# Patient Record
Sex: FEMALE | Race: WHITE | Employment: UNEMPLOYED | URBAN - METROPOLITAN AREA
[De-identification: names, ages, dates, MRNs, and addresses within clinical notes are randomized per-mention and may not be internally consistent; named-entity substitution may affect disease eponyms.]

---

## 2019-08-30 ENCOUNTER — OFFICE VISIT (OUTPATIENT)
Dept: ENDOCRINOLOGY | Age: 59
End: 2019-08-30
Payer: COMMERCIAL

## 2019-08-30 ENCOUNTER — TELEPHONE (OUTPATIENT)
Dept: ENDOCRINOLOGY | Age: 59
End: 2019-08-30

## 2019-08-30 VITALS
BODY MASS INDEX: 15.95 KG/M2 | RESPIRATION RATE: 16 BRPM | HEIGHT: 64 IN | WEIGHT: 93.4 LBS | HEART RATE: 74 BPM | DIASTOLIC BLOOD PRESSURE: 62 MMHG | OXYGEN SATURATION: 98 % | SYSTOLIC BLOOD PRESSURE: 112 MMHG

## 2019-08-30 DIAGNOSIS — M81.0 OSTEOPOROSIS, UNSPECIFIED OSTEOPOROSIS TYPE, UNSPECIFIED PATHOLOGICAL FRACTURE PRESENCE: Primary | ICD-10-CM

## 2019-08-30 DIAGNOSIS — E55.9 VITAMIN D DEFICIENCY: ICD-10-CM

## 2019-08-30 DIAGNOSIS — E03.9 HYPOTHYROIDISM, UNSPECIFIED TYPE: ICD-10-CM

## 2019-08-30 PROCEDURE — 99214 OFFICE O/P EST MOD 30 MIN: CPT | Performed by: INTERNAL MEDICINE

## 2019-08-30 RX ORDER — CHOLECALCIFEROL (VITAMIN D3) 1250 MCG
CAPSULE ORAL
COMMUNITY
End: 2019-09-12 | Stop reason: SDUPTHER

## 2019-08-30 RX ORDER — HYOSCYAMINE SULFATE EXTENDED-RELEASE 0.38 MG/1
375 TABLET ORAL PRN
COMMUNITY

## 2019-08-30 RX ORDER — GABAPENTIN 300 MG/1
300 CAPSULE ORAL DAILY
COMMUNITY

## 2019-08-30 RX ORDER — AMOXICILLIN 500 MG/1
500 CAPSULE ORAL 3 TIMES DAILY
COMMUNITY
End: 2020-03-02

## 2019-08-30 RX ORDER — TRAMADOL HYDROCHLORIDE 50 MG/1
50 TABLET ORAL PRN
COMMUNITY
End: 2020-03-02

## 2019-08-30 RX ORDER — ALPRAZOLAM 0.5 MG/1
0.5 TABLET ORAL NIGHTLY PRN
COMMUNITY

## 2019-08-30 RX ORDER — SUCRALFATE 1 G/1
1 TABLET ORAL 4 TIMES DAILY
COMMUNITY

## 2019-08-30 RX ORDER — CALCITONIN SALMON 200 [IU]/.09ML
1 SPRAY, METERED NASAL DAILY
COMMUNITY
End: 2021-04-14

## 2019-08-30 RX ORDER — CYANOCOBALAMIN 1000 UG/ML
1000 INJECTION INTRAMUSCULAR; SUBCUTANEOUS
COMMUNITY

## 2019-08-30 RX ORDER — MIRTAZAPINE 30 MG/1
30 TABLET, FILM COATED ORAL NIGHTLY
COMMUNITY

## 2019-08-30 RX ORDER — LEVOTHYROXINE SODIUM 0.15 MG/1
150 TABLET ORAL DAILY
COMMUNITY
End: 2020-03-02

## 2019-08-30 RX ORDER — CLOBETASOL PROPIONATE 0.5 MG/ML
LOTION TOPICAL 2 TIMES DAILY
COMMUNITY
End: 2020-03-02

## 2019-08-30 RX ORDER — ONDANSETRON HYDROCHLORIDE 8 MG/1
8 TABLET, FILM COATED ORAL EVERY 8 HOURS PRN
COMMUNITY

## 2019-08-30 RX ORDER — POTASSIUM CHLORIDE 20MEQ/15ML
20 LIQUID (ML) ORAL DAILY
COMMUNITY

## 2019-08-30 NOTE — PROGRESS NOTES
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    Pernicious anemia 10/14/2011    Unspecified disease of white blood cells 10/14/2011     PAST SURGICAL HISTORY    COLONOSCOPY    REMOVAL GALLBLADDER    STOMACH SURGERY PROCEDURE UNLISTED    UPPER GI ENDOSCOPY     SOCIAL HISTORY    Marital status:  Spouse name: N/A     Smoking status: Never Smoker    Smokeless tobacco: Never Used    Alcohol use Yes     FAMILY HISTORY    Diabetes Sister    Diabetes Brother    Ca, Skin Melanoma Biological Father      ALLERGIES AND DRUG REACTIONS   No Known Allergies    CURRENT MEDICATIONS     Current Outpatient Medications   Medication Sig Dispense Refill    ALPRAZolam (XANAX) 0.5 MG tablet Take 0.5 mg by mouth nightly as needed for Sleep.  calcitonin (MIACALCIN) 200 UNIT/ACT nasal spray 1 spray by Nasal route daily      Cholecalciferol (VITAMIN D3) 76691 units CAPS Take by mouth 1 tablet every day expect sat and Sunday take 2 tablets      cyanocobalamin 1000 MCG/ML injection Inject 1,000 mcg into the muscle every 7 days      gabapentin (NEURONTIN) 300 MG capsule Take 300 mg by mouth daily.       hyoscyamine (LEVBID) 375 MCG extended release tablet Take 375 mcg by mouth as needed for Cramping      levothyroxine (SYNTHROID) 150 MCG tablet Take 150 mcg by mouth Daily      mirtazapine (REMERON) 30 MG tablet Take 30 mg by mouth nightly      CALCIUM-VITAMIN D PO Take by mouth daily      Nutritional Supplements (VITAL AF 1.2 ELISABETH ADV FORMULA PO) Take by mouth daily      Multiple Vitamins-Minerals (MULTIVITAMIN ADULT PO) Take by mouth daily      ondansetron (ZOFRAN) 8 MG tablet Take 8 mg by mouth every 8 hours as needed for Nausea or Vomiting      Pancrelipase, Lip-Prot-Amyl, (CREON PO) Take by mouth thyromegaly, no thyroid tenderness, no JVD. Pulm: clear equal air entry no added sounds,  CVS: S1 + S2, no murmur, no heave. Abd: soft lax no tenderness, no organomegaly, audible bowel sounds. MSK: no back deformity, no local spine tesnderness  Skin: warm, no lesions, no rash.  No striae no Bruises   Neuro: CN intact, sensation notmal , muscle power normal  Psych: normal mood, and affect     Lab review   I personally reviewed the following labs:   COMP METABOLIC PANEL 2/82/0154   Component Name Value   Glucose 100   Urea Nitrogen 21 (H)   Creatinine 1.28   BUN/CREATININE RATIO 16   Total Bilirubin 0.5   Total Protein 6.4   Albumin 3.2 (L)   A/G RATIO 1.0   Calcium(Ca) 9.0   Alkaline Phosphatase 57   Aspartate Aminot.(AST) 13 (L)   Alanine Aminotrans(ALT) 31   Sodium(Na) 136   Potassium(K) 2.1 (L)   Comment:  Results called to and read back by  DR CHON MENJIVAR 04 12 19 1755 DA   Chloride(Cl) 105   Carbon Dioxide(CO2) 24.0   ANION GAP 9.1   CALCULATED OSMOLALITY 285   EGFR NON  46 (L)     CBC AND DIFF W/ PLATELETSResulted: 9/1/2738   Component Name Value   WBC 4.1 (L)   RBC 4.38   Hgb 12.5   Hematocrit(HCT) 38.6   MCV 88.2   MCH 28.6   MCHC 32.4 (L)   RDW 18.3 (H)   Platelets 751   Mean Platelet Volume 7.8   Diff Type Automated Differential   Neutrophils 41.7   Lymphocytes 42.9   Monocytes 9.1   Eosinophils 4.7   Basophils 1.6   ABS Neutrophils 1.7 (L)   ABS Lymphocytes 1.8   ABS Monocytes 0.4   ABS Eosinophils 0.2   ABS Basophils 0.1     VITAMIN D 25-OH 4/4/2019   Component Name Value   Vitamin D, 25-Hydroxy 72.2         No results found for: WBC, RBC, HGB, HCT, MCV, MCH, MCHC, RDW, PLT, MPV, GRANULOCYTES, BANDS   No results found for: NA, K, CO2, BUN, CALCIUM, GFR   No results found for: LABA1C, GLUCOSE, MALBCR, LABMICR, LABCREA  No results found for: TSH, T4FREE, D0CIMQG, FT3, W9YSYUH, TSI, TPOABS, THGAB  No results found for: PTH  No results found for: MG  No results found for: PHOS  No results found and hence the fluctuation  - discussed possible timing- best for her will be at night 1 hr before starting the TF, and can make that 2:30-3 hrs after last food. No ca or MV or carafate close to it this way. - if doesn't stabilize after fixing timing/technical issues can then try brand synthroid, some people have fluctuations from generic from different companies. Return in about 6 months (around 2/29/2020) for Hypothyroidism, Osteoporosis . The above issues were reviewed with the patient who understood and agreed with the plan. Thank you for allowing us to participate in the care of this patient. Please do not hesitate to contact us with any additional questions. Diagnosis Orders   1. Osteoporosis, unspecified osteoporosis type, unspecified pathological fracture presence  Comprehensive Metabolic Panel    PTH, Intact    Vitamin D 25 Hydroxy    DEXA Bone Density Axial Skeleton    TSH without Reflex    denosumab (PROLIA) 60 MG/ML SOSY SC injection   2. Vitamin D deficiency  Vitamin D 25 Hydroxy   3.  Hypothyroidism, unspecified type  TSH without Reflex    T4, Free     Barb Sánchez MD  Endocrinologist, [facility])   [address] [place] [address], [address] [place] [address],[address] [address]   Phone: [phone]  Fax: [phone]  -------------------------------------------------------------  Electronically signed by James Elliott MD

## 2019-09-03 ENCOUNTER — TELEPHONE (OUTPATIENT)
Dept: ENDOCRINOLOGY | Age: 59
End: 2019-09-03

## 2019-09-04 LAB
ALBUMIN SERPL-MCNC: 2.6 G/DL
ALP BLD-CCNC: NORMAL U/L
ALT SERPL-CCNC: 53 U/L
ANION GAP SERPL CALCULATED.3IONS-SCNC: NORMAL MMOL/L
AST SERPL-CCNC: 48 U/L
BILIRUB SERPL-MCNC: NORMAL MG/DL (ref 0.1–1.4)
BUN BLDV-MCNC: NORMAL MG/DL
CALCIUM SERPL-MCNC: 8.5 MG/DL
CHLORIDE BLD-SCNC: NORMAL MMOL/L
CO2: NORMAL MMOL/L
CREAT SERPL-MCNC: 1.07 MG/DL
GFR CALCULATED: NORMAL
GLUCOSE BLD-MCNC: NORMAL MG/DL
POTASSIUM SERPL-SCNC: 3.1 MMOL/L
PTH INTACT: 32.7
SODIUM BLD-SCNC: 145 MMOL/L
T4 FREE: 1.13
TOTAL PROTEIN: NORMAL
TSH SERPL DL<=0.05 MIU/L-ACNC: 2.3 UIU/ML
VITAMIN D 25-HYDROXY: 37.4
VITAMIN D2, 25 HYDROXY: NORMAL
VITAMIN D3,25 HYDROXY: NORMAL

## 2019-09-06 ENCOUNTER — TELEPHONE (OUTPATIENT)
Dept: ENDOCRINOLOGY | Age: 59
End: 2019-09-06

## 2019-09-06 DIAGNOSIS — E55.9 VITAMIN D DEFICIENCY: ICD-10-CM

## 2019-09-06 DIAGNOSIS — M81.0 OSTEOPOROSIS, UNSPECIFIED OSTEOPOROSIS TYPE, UNSPECIFIED PATHOLOGICAL FRACTURE PRESENCE: ICD-10-CM

## 2019-09-06 DIAGNOSIS — E03.9 HYPOTHYROIDISM, UNSPECIFIED TYPE: ICD-10-CM

## 2019-09-12 DIAGNOSIS — E55.9 VITAMIN D DEFICIENCY: Primary | ICD-10-CM

## 2019-09-14 RX ORDER — CHOLECALCIFEROL (VITAMIN D3) 1250 MCG
CAPSULE ORAL
Qty: 1 CAPSULE | Refills: 1 | Status: SHIPPED | OUTPATIENT
Start: 2019-09-14 | End: 2019-10-29 | Stop reason: CLARIF

## 2019-09-17 ENCOUNTER — TELEPHONE (OUTPATIENT)
Dept: ENDOCRINOLOGY | Age: 59
End: 2019-09-17

## 2019-09-19 ENCOUNTER — TELEPHONE (OUTPATIENT)
Dept: ENDOCRINOLOGY | Age: 59
End: 2019-09-19

## 2019-09-24 ENCOUNTER — NURSE ONLY (OUTPATIENT)
Dept: ENDOCRINOLOGY | Age: 59
End: 2019-09-24
Payer: COMMERCIAL

## 2019-09-24 DIAGNOSIS — M81.8 OTHER OSTEOPOROSIS, UNSPECIFIED PATHOLOGICAL FRACTURE PRESENCE: Primary | ICD-10-CM

## 2019-09-24 PROCEDURE — 96372 THER/PROPH/DIAG INJ SC/IM: CPT | Performed by: INTERNAL MEDICINE

## 2019-10-29 DIAGNOSIS — M81.8 OTHER OSTEOPOROSIS, UNSPECIFIED PATHOLOGICAL FRACTURE PRESENCE: Primary | ICD-10-CM

## 2019-10-29 RX ORDER — ERGOCALCIFEROL (VITAMIN D2) 1250 MCG
CAPSULE ORAL
Qty: 114 CAPSULE | Refills: 1 | Status: SHIPPED
Start: 2019-10-29 | End: 2020-05-12 | Stop reason: SDUPTHER

## 2020-03-02 ENCOUNTER — OFFICE VISIT (OUTPATIENT)
Dept: ENDOCRINOLOGY | Age: 60
End: 2020-03-02
Payer: COMMERCIAL

## 2020-03-02 VITALS
OXYGEN SATURATION: 98 % | RESPIRATION RATE: 16 BRPM | DIASTOLIC BLOOD PRESSURE: 58 MMHG | BODY MASS INDEX: 14.99 KG/M2 | SYSTOLIC BLOOD PRESSURE: 90 MMHG | HEART RATE: 63 BPM | WEIGHT: 84.6 LBS | HEIGHT: 63 IN

## 2020-03-02 PROCEDURE — 99214 OFFICE O/P EST MOD 30 MIN: CPT | Performed by: INTERNAL MEDICINE

## 2020-03-02 NOTE — LETTER
hematochezia   : Denied any dysuria, hematuria, flank pain, discharge, or incontinence. Skin: denied any rash, ulcer, Hirsute, or hyperpigmentation. MSK: denied any joint deformity, joint pain/swelling, muscle pain, or back pain. Neuro: no numbess, no tingling, no weakness, __  Psychiatric/Behavioral: Negative for sleep disturbance and dysphoric mood. The patient is not nervous/anxious. Objective:   BP (!) 90/58 (Site: Left Upper Arm, Position: Sitting, Cuff Size: Medium Adult)   Pulse 63   Resp 16   Ht 5' 3\" (1.6 m)   Wt 84 lb 9.6 oz (38.4 kg)   SpO2 98%   BMI 14.99 kg/m²    BP Readings from Last 4 Encounters:   03/02/20 (!) 90/58   08/30/19 112/62     Wt Readings from Last 6 Encounters:   03/02/20 84 lb 9.6 oz (38.4 kg)   08/30/19 93 lb 6.4 oz (42.4 kg)       Physical examination:  General: awake alert, no abnormal position or movements. HEENT: normocephalic non traumatic. Neck: supple, no LN enlargement, no thyromegaly, no thyroid tenderness, no JVD. Pulm: clear equal air entry no added sounds,  CVS: S1 + S2, no murmur, no heave. Abd: soft lax no tenderness, no organomegaly, audible bowel sounds. MSK: no back deformity, no local spine tesnderness  Skin: warm, no lesions, no rash.  No striae no Bruises   Neuro: CN intact, sensation notmal , muscle power normal  Psych: normal mood, and affect     Lab review   I personally reviewed the following labs:   BASIC METABOLIC PANEL (93/90/3943 11:30 AM EST)  Component Value   Glucose 95   Urea Nitrogen 22 (H)   Creatinine 0.86   BUN/CREATININE RATIO 26   Sodium(Na) 146 (H)   Potassium(K) 3.3 (L)   Chloride(Cl) 112 (H)   Carbon Dioxide(CO2) 30.0   ANION GAP 7.3   Calcium(Ca) 8.8   CALCULATED OSMOLALITY 305 (H)   EGFR NON  73   LIPID BATTERY (01/09/2020 11:30 AM EST)  Component Value   Cholesterol 175   Triglyceride 51   High Density Lipoprotein(HDL) 91 (H)   Very Low Density Lipoprotein 10   Low Density Lipoprotein 74 - avoiding oral Bisphosphonate due to significant GI problems and large gastric ulcer and GI bleeping   - last DXA 10/2017 lowest T -4  - on prolia since 3/2018, tolerated well  - Next prolia injection 3/24/2020. VitD and calcium were normal few weeks ago  - cont vit D  - fall precautions  - Discussed the importance of muscle strengthening and weight bearing exercise. vitD deficiency   - With multiple GI surgery she developed significant malabsorption to vitD   - on vitD 50,000 units 1 capsule 5 days a week and 2 capsule on Saturday and Sunday   - Level now 32     Hypothyroidism  - currently on levothyroxine 175 mcg daily   - malabsorption is the likely cause of high requirement   - if doesn't stabilize after fixing timing/technical issues can then try brand synthroid, some people have fluctuations from generic from different companies. Return in about 1 year (around 3/2/2021) for Osteoporosis . The above issues were reviewed with the patient who understood and agreed with the plan. Thank you for allowing us to participate in the care of this patient. Please do not hesitate to contact us with any additional questions. Diagnosis Orders   1. Osteoporosis, unspecified osteoporosis type, unspecified pathological fracture presence  Basic Metabolic Panel    Vitamin D 25 Hydroxy   2. Hypothyroidism, unspecified type  TSH without Reflex    T4, Free   3.  Vitamin D deficiency  Vitamin D 25 Hydroxy     Elise Marcus MD  Endocrinologist, [facility])   [address] [place] [address]   Phone: [phone]  Fax: [phone]  -------------------------------------------------------------  Electronically signed by Giorgi Alston MD

## 2020-03-02 NOTE — PROGRESS NOTES
[facility] [address] [facility] Department of Endocrinology Diabetes and Metabolism   [address] [facility] [address]   Phone: [phone]  Fax: [phone]    Date of Service: 3/2/2020    Primary Care Physician: Kimberlee Moreno DO. Provider: [place] MD        Reason for the visit:  Osteoporosis     History of present illness: The history is provided by the patient. No  was used. Accuracy of the patient data is excellent  Isabel Quinonez is a very pleasant 59 y.o. female seen in Endocrine clinic today for     Ms Isabel Quinonez has osteoporosis   In 2/2016 she fell and broke her Rt forearm. Was diagnosed with osteoporosis then, before that had osteopenia. She reported significant GI issue. She had multiple GI surgeries for perforated large peptic ulcer in 2009. has feeding tube in place but still able to swallow pills   She is getting Vital 1.2 tube feeds through feeding tube   denied personal or family history of kidney stone. With h/o significant PUD, bisphosphonates were avoided. Reported was given Calcitonin initially by PCP, says still on it. Started on Prolia in March/2018, 10/2018    last DXA scan 10/11/2017   Total Hip T-score of -4.2 --> 13.4% decrease in BMD from baseline exam of 06/10/2013 . LS T-score of -4.1 --> 10.2% decrease in BMD from baseline. She is currently on vitD 50,000 dailyx5 days then 2 tabs a day on weekends. Level improved. She is on calquick one a day, doesn't know the content or dose, and a MV. Has cheese and lots of dairy in diet. No falls or fractures. No pending dental work. Patient also has hypothyroidism on 175 mcg daily just increased by PCP due to TSH above goal. Per PCP. Takes vit D and carafate with it. Takes ca during the day.  Takes the TF t night 9:30-6:30 am.       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    Pernicious anemia 10/14/2011    Unspecified disease of white blood cells 10/14/2011     PAST SURGICAL HISTORY    COLONOSCOPY    REMOVAL GALLBLADDER    STOMACH SURGERY PROCEDURE UNLISTED    UPPER GI ENDOSCOPY     SOCIAL HISTORY    Marital status:  Spouse name: N/A     Smoking status: Never Smoker    Smokeless tobacco: Never Used    Alcohol use Yes     FAMILY HISTORY    Diabetes Sister    Diabetes Brother    Ca, Skin Melanoma Biological Father      ALLERGIES AND DRUG REACTIONS   No Known Allergies    CURRENT MEDICATIONS     Current Outpatient Medications   Medication Sig Dispense Refill    ergocalciferol (ERGOCALCIFEROL) 1.25 MG (07199 UT) capsule One tab po Monday through Friday, and 2 tablets Saturday and Sunday. 114 capsule 1    ALPRAZolam (XANAX) 0.5 MG tablet Take 0.5 mg by mouth nightly as needed for Sleep.  calcitonin (MIACALCIN) 200 UNIT/ACT nasal spray 1 spray by Nasal route daily      cyanocobalamin 1000 MCG/ML injection Inject 1,000 mcg into the muscle every 7 days      gabapentin (NEURONTIN) 300 MG capsule Take 300 mg by mouth daily.       hyoscyamine (LEVBID) 375 MCG extended release tablet Take 375 mcg by mouth as needed for Cramping      levothyroxine (SYNTHROID) 150 MCG tablet Take 150 mcg by mouth Daily Pt takes 175 mcg daily      mirtazapine (REMERON) 30 MG tablet Take 30 mg by mouth nightly      CALCIUM-VITAMIN D PO Take by mouth daily      Nutritional Supplements (VITAL AF 1.2 ELISBAETH ADV FORMULA PO) Take by mouth daily      Multiple Vitamins-Minerals (MULTIVITAMIN ADULT PO) Take by mouth daily      ondansetron (ZOFRAN) 8 MG tablet Take 8 mg by mouth every 8 hours as needed for Nausea or Vomiting      Pancrelipase, Lip-Prot-Amyl, (CREON PO) Take by mouth 180,000 units, 34631 units 237912--- takes enlargement, no thyromegaly, no thyroid tenderness, no JVD. Pulm: clear equal air entry no added sounds,  CVS: S1 + S2, no murmur, no heave. Abd: soft lax no tenderness, no organomegaly, audible bowel sounds. MSK: no back deformity, no local spine tesnderness  Skin: warm, no lesions, no rash. No striae no Bruises   Neuro: CN intact, sensation notmal , muscle power normal  Psych: normal mood, and affect     Lab review   I personally reviewed the following labs:   BASIC METABOLIC PANEL (44/56/0909 11:30 AM EST)  Component Value   Glucose 95   Urea Nitrogen 22 (H)   Creatinine 0.86   BUN/CREATININE RATIO 26   Sodium(Na) 146 (H)   Potassium(K) 3.3 (L)   Chloride(Cl) 112 (H)   Carbon Dioxide(CO2) 30.0   ANION GAP 7.3   Calcium(Ca) 8.8   CALCULATED OSMOLALITY 305 (H)   EGFR NON  73   LIPID BATTERY (01/09/2020 11:30 AM EST)  Component Value   Cholesterol 175   Triglyceride 51   High Density Lipoprotein(HDL) 91 (H)   Very Low Density Lipoprotein 10   Low Density Lipoprotein 74     VITAMIN D 25-OH SCREEN FOR DEFICIENCY/TOXICITY (01/09/2020 11:30 AM EST)  Component Value   Vitamin D, 25-Hydroxy 32.9     HEPATIC FUNCTION PANEL (01/09/2020 11:30 AM EST)  Component Value   Total Bilirubin 0.5   Direct Bilirubin 0.1   Total Protein 5.7 (L)   Albumin 2.9 (L)   A/G RATIO 1.0   Alkaline Phosphatase 41 (L)   Aspartate Aminot.(AST) 26   Alanine Aminotrans(ALT) 36     TSH/THY. STIM. HORM (01/09/2020 11:30 AM EST)  Component Value   TSH/Thy.Stim. Horm 3.700     CBC AND DIFF W/ PLATELETS (30/14/2012 11:30 AM EST)  Component Value   WBC 4.6 (L)   RBC 4.22   Hgb 11.7 (L)   Hematocrit(HCT) 36.0 (L)   MCV 85.3   MCH 27.8 (L)   MCHC 32.6 (L)   RDW 15.3 (H)   Platelets 425   Mean Platelet Volume 8.2   Diff Type Automated Differential   Neutrophils 48.3   Lymphocytes 35.5   Monocytes 9.6   Eosinophils 4.5   Basophils 2.1   ABS Neutrophils 2.2   ABS Lymphocytes 1.6   ABS Monocytes 0.4   ABS Eosinophils 0.2   ABS Basophils 0.1 and agreed with the plan. Thank you for allowing us to participate in the care of this patient. Please do not hesitate to contact us with any additional questions. Diagnosis Orders   1. Osteoporosis, unspecified osteoporosis type, unspecified pathological fracture presence  Basic Metabolic Panel    Vitamin D 25 Hydroxy   2. Hypothyroidism, unspecified type  TSH without Reflex    T4, Free   3.  Vitamin D deficiency  Vitamin D 25 Hydroxy     Scott Wu MD  Endocrinologist, [address] [address]   Phone: [phone]  Fax: [phone]  -------------------------------------------------------------  Electronically signed by Koffi Sanchez MD

## 2020-03-23 ENCOUNTER — TELEPHONE (OUTPATIENT)
Dept: ENDOCRINOLOGY | Age: 60
End: 2020-03-23

## 2020-04-02 ENCOUNTER — NURSE ONLY (OUTPATIENT)
Dept: ENDOCRINOLOGY | Age: 60
End: 2020-04-02

## 2020-05-12 RX ORDER — ERGOCALCIFEROL (VITAMIN D2) 1250 MCG
CAPSULE ORAL
Qty: 114 CAPSULE | Refills: 3 | Status: SHIPPED
Start: 2020-05-12 | End: 2021-08-31 | Stop reason: SDUPTHER

## 2020-10-23 ENCOUNTER — NURSE ONLY (OUTPATIENT)
Dept: ENDOCRINOLOGY | Age: 60
End: 2020-10-23
Payer: COMMERCIAL

## 2020-10-23 PROCEDURE — 96372 THER/PROPH/DIAG INJ SC/IM: CPT | Performed by: INTERNAL MEDICINE

## 2020-10-23 RX ORDER — DENOSUMAB 60 MG/ML
60 INJECTION SUBCUTANEOUS ONCE
Qty: 1 ML | Refills: 0 | Status: SHIPPED
Start: 2020-10-23 | End: 2021-04-14

## 2021-03-02 ENCOUNTER — OFFICE VISIT (OUTPATIENT)
Dept: ENDOCRINOLOGY | Age: 61
End: 2021-03-02
Payer: MEDICARE

## 2021-03-02 VITALS
DIASTOLIC BLOOD PRESSURE: 68 MMHG | SYSTOLIC BLOOD PRESSURE: 110 MMHG | HEART RATE: 64 BPM | BODY MASS INDEX: 16.79 KG/M2 | OXYGEN SATURATION: 99 % | WEIGHT: 94.8 LBS | TEMPERATURE: 97.7 F

## 2021-03-02 DIAGNOSIS — E03.9 HYPOTHYROIDISM, UNSPECIFIED TYPE: ICD-10-CM

## 2021-03-02 DIAGNOSIS — M81.0 OSTEOPOROSIS, UNSPECIFIED OSTEOPOROSIS TYPE, UNSPECIFIED PATHOLOGICAL FRACTURE PRESENCE: ICD-10-CM

## 2021-03-02 DIAGNOSIS — M81.8 OTHER OSTEOPOROSIS, UNSPECIFIED PATHOLOGICAL FRACTURE PRESENCE: Primary | ICD-10-CM

## 2021-03-02 DIAGNOSIS — E55.9 VITAMIN D DEFICIENCY: ICD-10-CM

## 2021-03-02 PROCEDURE — G8427 DOCREV CUR MEDS BY ELIG CLIN: HCPCS | Performed by: INTERNAL MEDICINE

## 2021-03-02 PROCEDURE — G8419 CALC BMI OUT NRM PARAM NOF/U: HCPCS | Performed by: INTERNAL MEDICINE

## 2021-03-02 PROCEDURE — G8484 FLU IMMUNIZE NO ADMIN: HCPCS | Performed by: INTERNAL MEDICINE

## 2021-03-02 PROCEDURE — 3017F COLORECTAL CA SCREEN DOC REV: CPT | Performed by: INTERNAL MEDICINE

## 2021-03-02 PROCEDURE — 99214 OFFICE O/P EST MOD 30 MIN: CPT | Performed by: INTERNAL MEDICINE

## 2021-03-02 PROCEDURE — 1036F TOBACCO NON-USER: CPT | Performed by: INTERNAL MEDICINE

## 2021-03-02 NOTE — PROGRESS NOTES
proliaMHYX [address] [facility] Department of Endocrinology Diabetes and Metabolism   [address] [facility] [address]   Phone: [phone]  Fax: [phone]    Date of Service: 3/2/2021  Primary Care Physician: Vinicio Ocampo DO. Provider: Mary Salomon MD        Reason for the visit:  Osteoporosis, vitD deficiency      History of present illness: The history is provided by the patient. No  was used. Accuracy of the patient data is excellent  Meredith Yeung is a very pleasant 56 y.o. female seen today for follow up visit     In 2/2016 she fell and broke her Rt forearm. Was diagnosed with osteoporosis then, before that had osteopenia. She reported significant GI issue. She had multiple GI surgeries for perforated large peptic ulcer in 2009. has feeding tube in place but still able to swallow pills   She is getting Vital 1.2 tube feeds through feeding tube     The patient denied personal or family history of kidney stone. With h/o significant PUD, bisphosphonates were avoided. Reported was given Calcitonin initially by PCP and still on it. Started on Prolia in March/2018 and tolerating it very well     DXA scan 10/11/2017   Total Hip T-score of -4.2 --> 13.4% decrease in BMD from baseline exam of 06/10/2013 . LS T-score of -4.1 --> 10.2% decrease in BMD from baseline. last DXA 10/2019, Hip T score - 4.0, LS T score -4.0     She is currently on vitD 50,000 daily x 5 days then 2 tabs a day on weekends. Level improved. No recent falls or fractures. No pending dental work. Patient also has hypothyroidism on 175 mcg daily just increased by PCP due to TSH above goal. Per PCP. Takes vit D and carafate with it. Takes ca during the day.  Takes the TF t night 9:30-6:30 am.       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    Pernicious anemia 10/14/2011    Unspecified disease of white blood cells 10/14/2011     PAST SURGICAL HISTORY    COLONOSCOPY    REMOVAL GALLBLADDER    STOMACH SURGERY PROCEDURE UNLISTED    UPPER GI ENDOSCOPY     SOCIAL HISTORY    Marital status:  Spouse name: N/A     Smoking status: Never Smoker    Smokeless tobacco: Never Used    Alcohol use Yes     FAMILY HISTORY    Diabetes Sister    Diabetes Brother    Ca, Skin Melanoma Biological Father      ALLERGIES AND DRUG REACTIONS   Allergies   Allergen Reactions    Metronidazole Nausea And Vomiting       CURRENT MEDICATIONS     Current Outpatient Medications   Medication Sig Dispense Refill    sterile water SOLN with amino acids 10 % SOLN 1.3 g/kg, dextrose 70 % SOLN 20 % Infuse intravenously continuous      ergocalciferol (ERGOCALCIFEROL) 1.25 MG (90675 UT) capsule One tab po Monday through Friday, and 2 tablets Saturday and Sunday. 114 capsule 3    denosumab (PROLIA) 60 MG/ML SOSY SC injection Inject 1 ml (60 mg) into the skin every 6 months 1 mL 3    ALPRAZolam (XANAX) 0.5 MG tablet Take 0.5 mg by mouth nightly as needed for Sleep.  calcitonin (MIACALCIN) 200 UNIT/ACT nasal spray 1 spray by Nasal route daily      cyanocobalamin 1000 MCG/ML injection Inject 1,000 mcg into the muscle every 7 days      gabapentin (NEURONTIN) 300 MG capsule Take 300 mg by mouth daily.       hyoscyamine (LEVBID) 375 MCG extended release tablet Take 375 mcg by mouth as needed for Cramping      mirtazapine (REMERON) 30 MG tablet Take 30 mg by mouth nightly      CALCIUM-VITAMIN D PO Take by mouth daily      Nutritional Supplements (VITAL AF 1.2 ELISABETH ADV FORMULA PO) Take by mouth daily      Multiple Vitamins-Minerals (MULTIVITAMIN ADULT PO) Take by mouth daily      ondansetron (ZOFRAN) 8 MG tablet Take 8 mg by mouth every 8 hours as needed for Nausea or Vomiting      Pancrelipase, Lip-Prot-Amyl, (CREON PO) Take by mouth 180,000 units, 56983 units 528112--- takes with meals      Polyethylene Glycol 3350 (MIRALAX PO) Take by mouth daily      potassium chloride 20 MEQ/15ML (10%) oral solution Take 20 mEq by mouth daily      sucralfate (CARAFATE) 1 GM tablet Take 1 g by mouth 4 times daily      denosumab (PROLIA) 60 MG/ML SOSY SC injection Inject 1 mL into the skin once for 1 dose 1 mL 0     No current facility-administered medications for this visit. Review of Systems    Constitutional: No fever, no chills, no diaphoresis, no generalized weakness. HEENT: No blurred vision, No sore throat, no ear pain, no hair loss  Neck: denied any neck swelling, difficulty swallowing,   Cadrdiopulomary: No CP, SOB or palpitation, No orthopnea or PND. No cough or wheezing. GI: No N/V/D, no constipation, No abdominal pain, no melena or hematochezia   : Denied any dysuria, hematuria, flank pain, discharge, or incontinence. Skin: denied any rash, ulcer, Hirsute, or hyperpigmentation. MSK: denied any joint deformity, joint pain/swelling, muscle pain, or back pain. Neuro: no numbess, no tingling, no weakness, __  Psychiatric/Behavioral: Negative for sleep disturbance and dysphoric mood. The patient is not nervous/anxious. Objective:   /68   Pulse 64   Temp 97.7 °F (36.5 °C)   Wt 94 lb 12.8 oz (43 kg)   SpO2 99%   BMI 16.79 kg/m²   BP Readings from Last 4 Encounters:   03/02/21 110/68   03/02/20 (!) 90/58   08/30/19 112/62     Wt Readings from Last 6 Encounters:   03/02/21 94 lb 12.8 oz (43 kg)   03/02/20 84 lb 9.6 oz (38.4 kg)   08/30/19 93 lb 6.4 oz (42.4 kg)       Physical examination:  General: awake alert, no abnormal position or movements. HEENT: normocephalic non traumatic. Neck: supple, no LN enlargement, no thyromegaly, no thyroid tenderness, no JVD.   Pulm: clear equal air entry no added sounds,  CVS: S1 + S2, no murmur, no davide.  Abd: soft lax no tenderness, no organomegaly, audible bowel sounds. MSK: no back deformity, no local spine tesnderness  Skin: warm, no lesions, no rash.  No striae no Bruises   Neuro: CN intact, sensation notmal , muscle power normal  Psych: normal mood, and affect     Lab review   I personally reviewed the following labs:  BASIC METABOLIC PANELResulted: 7/5/1810 12:07 PM  [facility]  Component Name Value   Glucose 170 (H)   Urea Nitrogen 18   Creatinine 0.94   BUN/CREATININE RATIO 19   Sodium(Na) 140   Potassium(K) 3.6   Chloride(Cl) 110 (H)   Carbon Dioxide(CO2) 25.0   ANION GAP 8.6   Calcium(Ca) 8.4 (L)   CALCULATED OSMOLALITY 296 (H)   EGFR NON  65       CBC AND DIFFERENTIALResulted: 2/23/2021 12:26 PM  [facility]  Component Name Value   WBC 6.6   RBC 3.42 (L)   Hgb 9.4 (L)   Hematocrit(HCT) 29.7 (L)   MCV 86.7   MCH 27.5 (L)   MCHC 31.7 (L)   RDW 16.5 (H)   Platelets 373   Mean Platelet Volume 7.6   Diff Type Automated Differential   Neutrophils 72.8   Lymphocytes 17.7   Monocytes 6.4   Eosinophils 1.9   Basophils 1.2   ABS Neutrophils 4.8   ABS Lymphocytes 1.2   ABS Monocytes 0.4   ABS Eosinophils 0.1   ABS Basophils 0.1     VITAMIN D 25-OH SCREEN FOR DEFICIENCY/TOXICITY (01/09/2020 11:30 AM EST)  Component Value   Vitamin D, 25-Hydroxy 32.9     No results found for: WBC, RBC, HGB, HCT, MCV, MCH, MCHC, RDW, PLT, MPV, GRANULOCYTES, BANDS   Lab Results   Component Value Date/Time     09/04/2019    K 3.1 09/04/2019    CALCIUM 8.5 09/04/2019      No results found for: LABA1C, GLUCOSE, MALBCR, LABMICR, LABCREA  Lab Results   Component Value Date/Time    TSH 2.3 09/04/2019    T4FREE 1.13 09/04/2019     No results found for: PTH  No results found for: MG  No results found for: PHOS  Lab Results   Component Value Date    VITD25 37.4 09/04/2019     No results found for: CALCITONIN  No results found for: PTHRP  No results found for: Corby Essex  No results found for: [address] Records/Labs/Images Review:   I personally reviewed and summarized previous records   All labs and imaging were reviewed independently    Impression and plan:  Moose Carney who is 56 y.o. female in the clinic today management of the following issues     Osteoporosis   - main risk- malabsorption  - avoiding oral Bisphosphonate due to significant GI problems and large gastric ulcer and GI bleeping   - last DXA 10/2019, Hip T score - 4.0, LS T score -4.0   - on prolia since 3/2018, tolerated well  - Next prolia injection 4/2021. Will recheck VitD and calcium before injection   - cont vit D  - fall precautions  - Discussed the importance of muscle strengthening and weight bearing exercise. vitD deficiency   - With multiple GI surgery she developed significant malabsorption to vitD   - on vitD 50,000 units 1 capsule 5 days a week and 2 capsule on Saturday and Sunday     Hypothyroidism  - currently on levothyroxine 175 mcg daily   - malabsorption is the likely cause of high requirement   - if doesn't stabilize after fixing timing/technical issues can then try brand synthroid, some people have fluctuations from generic from different companies. I personally reviewed external notes from PCP and other patient's care team providers, and personally interpreted labs associated with the above diagnosis. I also ordered labs to further assess and manage the above addressed medical conditions    Return in about 6 months (around 9/2/2021) for osteoporosis, VitD deficiency. The above issues were reviewed with the patient who understood and agreed with the plan. Thank you for allowing us to participate in the care of this patient. Please do not hesitate to contact us with any additional questions. Diagnosis Orders   1. Other osteoporosis, unspecified pathological fracture presence  Basic Metabolic Panel    Vitamin D 25 Hydroxy    ALBUMIN   2.  Osteoporosis, unspecified osteoporosis type, unspecified pathological fracture presence     3. Hypothyroidism, unspecified type     4. Vitamin D deficiency  Basic Metabolic Panel    Vitamin D 25 Hydroxy     Dagmar Krishnamurthy MD  Endocrinologist, [facility])   [address] [place] [address]   Phone: [phone]  Fax: [phone]  -------------------------------------------------------------  An electronic signature was used to authenticate this note.  Lisset Sharma MD on 3/2/2021 at 6:44 PM

## 2021-03-03 ENCOUNTER — TELEPHONE (OUTPATIENT)
Dept: ENDOCRINOLOGY | Age: 61
End: 2021-03-03

## 2021-04-14 DIAGNOSIS — M81.0 OSTEOPOROSIS, UNSPECIFIED OSTEOPOROSIS TYPE, UNSPECIFIED PATHOLOGICAL FRACTURE PRESENCE: ICD-10-CM

## 2021-04-14 RX ORDER — DENOSUMAB 60 MG/ML
INJECTION SUBCUTANEOUS
Qty: 1 ML | Refills: 1 | Status: SHIPPED | OUTPATIENT
Start: 2021-04-14 | End: 2021-08-31

## 2021-04-16 DIAGNOSIS — M81.0 SENILE OSTEOPOROSIS: ICD-10-CM

## 2021-04-23 ENCOUNTER — HOSPITAL ENCOUNTER (OUTPATIENT)
Dept: INFUSION THERAPY | Age: 61
Setting detail: INFUSION SERIES
Discharge: HOME | End: 2021-04-23
Payer: MEDICARE

## 2021-04-23 VITALS
BODY MASS INDEX: 15.77 KG/M2 | OXYGEN SATURATION: 97 % | SYSTOLIC BLOOD PRESSURE: 84 MMHG | HEART RATE: 64 BPM | WEIGHT: 89 LBS | HEIGHT: 63 IN | DIASTOLIC BLOOD PRESSURE: 38 MMHG | TEMPERATURE: 97.6 F | RESPIRATION RATE: 16 BRPM

## 2021-04-23 DIAGNOSIS — M81.0 SENILE OSTEOPOROSIS: Primary | ICD-10-CM

## 2021-04-23 PROCEDURE — 6360000002 HC RX W HCPCS: Performed by: INTERNAL MEDICINE

## 2021-04-23 PROCEDURE — 96372 THER/PROPH/DIAG INJ SC/IM: CPT

## 2021-04-23 RX ADMIN — DENOSUMAB 60 MG: 60 INJECTION SUBCUTANEOUS at 09:37

## 2021-08-31 ENCOUNTER — OFFICE VISIT (OUTPATIENT)
Dept: ENDOCRINOLOGY | Age: 61
End: 2021-08-31
Payer: MEDICARE

## 2021-08-31 VITALS
DIASTOLIC BLOOD PRESSURE: 57 MMHG | OXYGEN SATURATION: 100 % | WEIGHT: 84 LBS | HEIGHT: 63 IN | SYSTOLIC BLOOD PRESSURE: 91 MMHG | BODY MASS INDEX: 14.88 KG/M2 | HEART RATE: 65 BPM

## 2021-08-31 DIAGNOSIS — E03.9 HYPOTHYROIDISM, UNSPECIFIED TYPE: ICD-10-CM

## 2021-08-31 DIAGNOSIS — E55.9 VITAMIN D DEFICIENCY: ICD-10-CM

## 2021-08-31 DIAGNOSIS — M81.8 OTHER OSTEOPOROSIS, UNSPECIFIED PATHOLOGICAL FRACTURE PRESENCE: Primary | ICD-10-CM

## 2021-08-31 PROCEDURE — G8419 CALC BMI OUT NRM PARAM NOF/U: HCPCS | Performed by: CLINICAL NURSE SPECIALIST

## 2021-08-31 PROCEDURE — 3017F COLORECTAL CA SCREEN DOC REV: CPT | Performed by: CLINICAL NURSE SPECIALIST

## 2021-08-31 PROCEDURE — 1036F TOBACCO NON-USER: CPT | Performed by: CLINICAL NURSE SPECIALIST

## 2021-08-31 PROCEDURE — G8427 DOCREV CUR MEDS BY ELIG CLIN: HCPCS | Performed by: CLINICAL NURSE SPECIALIST

## 2021-08-31 PROCEDURE — 99214 OFFICE O/P EST MOD 30 MIN: CPT | Performed by: CLINICAL NURSE SPECIALIST

## 2021-08-31 RX ORDER — ERGOCALCIFEROL (VITAMIN D2) 1250 MCG
CAPSULE ORAL
Qty: 120 CAPSULE | Refills: 3 | Status: SHIPPED | OUTPATIENT
Start: 2021-08-31

## 2021-08-31 RX ORDER — DENOSUMAB 60 MG/ML
60 INJECTION SUBCUTANEOUS ONCE
Qty: 1 ML | Refills: 0 | Status: SHIPPED | OUTPATIENT
Start: 2021-08-31 | End: 2022-03-29 | Stop reason: SDUPTHER

## 2021-08-31 RX ORDER — LEVOTHYROXINE SODIUM 0.2 MG/1
200 TABLET ORAL DAILY
COMMUNITY
Start: 2021-06-21

## 2021-08-31 RX ORDER — ESCITALOPRAM OXALATE 10 MG/1
TABLET ORAL
COMMUNITY
Start: 2021-03-09

## 2021-08-31 RX ORDER — LEVOTHYROXINE SODIUM 0.03 MG/1
25 TABLET ORAL DAILY
COMMUNITY
Start: 2021-03-09

## 2021-08-31 NOTE — PROGRESS NOTES
[address] [place] [address] Department of Endocrinology Diabetes and Metabolism   [address] [facility] [address]   Phone: [phone]  Fax: [phone]    Date of Service: 8/31/2021    Primary Care Physician: Regi Israel DO  Referring physician: No ref. provider found  Provider: Juanpablovivek CLEMONS    Reason for the visit: Osteoporosis, vitamin D deficiency      History of Present Illness: The history is provided by the patient. No  was used. Accuracy of the patient data is excellent. Luis M Palacios is a very pleasant 56 y.o. female seen today for follow up     In 2/2016 she fell and broke her Rt forearm. Was diagnosed with osteoporosis then, before that had osteopenia. She reported significant GI issue. She had multiple GI surgeries for perforated large peptic ulcer in 2009. has feeding tube but has not used it recently. Now using TPN  but still able to swallow pills   She is now on TPN 6 pm to 6 am        The patient denied personal or family history of kidney stone. With h/o significant PUD, bisphosphonates were avoided. Reported was given Calcitonin initially by PCP and still on it.      Started on Prolia in March/2018 and tolerating it very well     DXA scan 10/11/2017   Total Hip T-score of -4.2 --> 13.4% decrease in BMD from baseline exam of 06/10/2013 . LS T-score of -4.1 --> 10.2% decrease in BMD from baseline.     last DXA 10/2019, Hip T score - 4.0, LS T score -4.0     She is currently on vitD 50,000 daily x 5 days then 2 tabs a day on weekends. Level improved. Last vitamin D was 27 (8/21)    No recent falls or fractures. No pending dental work.      Patient also has hypothyroidism on total of 225  mcg daily   Last TSH not at goal 6.3 (8/21) See care everywhere         PAST MEDICAL HISTORY   Past Medical History:   Diagnosis Date    Arthritis     OSTEO    Hx of blood clots        PAST SURGICAL HISTORY   No past surgical history on file.    SOCIAL HISTORY   Tobacco:   reports that she has never smoked. She has never used smokeless tobacco.  Alcohol:   has no history on file for alcohol use. Drugs:   has no history on file for drug use. FAMILY HISTORY   No family history on file. ALLERGIES AND DRUG REACTIONS   Allergies   Allergen Reactions    Metronidazole Nausea And Vomiting       CURRENT MEDICATIONS   Current Outpatient Medications   Medication Sig Dispense Refill    levothyroxine (SYNTHROID) 25 MCG tablet Take 25 mcg by mouth daily      levothyroxine (SYNTHROID) 200 MCG tablet Take 200 mcg by mouth daily      escitalopram (LEXAPRO) 10 MG tablet TAKE 1 TABLET DAILY      ergocalciferol (ERGOCALCIFEROL) 1.25 MG (23122 UT) capsule One tab po Monday through thursday, 2 tablets, Friday, Saturday and Sunday. 120 capsule 3    denosumab (PROLIA) 60 MG/ML SOSY SC injection Inject 1 mL into the skin once for 1 dose 1 mL 0    ALPRAZolam (XANAX) 0.5 MG tablet Take 0.5 mg by mouth nightly as needed for Sleep.       cyanocobalamin 1000 MCG/ML injection Inject 1,000 mcg into the muscle every 7 days      hyoscyamine (LEVBID) 375 MCG extended release tablet Take 375 mcg by mouth as needed for Cramping      Nutritional Supplements (VITAL AF 1.2 ELISABETH ADV FORMULA PO) Take by mouth daily      Multiple Vitamins-Minerals (MULTIVITAMIN ADULT PO) Take by mouth daily      ondansetron (ZOFRAN) 8 MG tablet Take 8 mg by mouth every 8 hours as needed for Nausea or Vomiting      Pancrelipase, Lip-Prot-Amyl, (CREON PO) Take by mouth 180,000 units, 20178 units 683456--- takes with meals      Polyethylene Glycol 3350 (MIRALAX PO) Take by mouth daily      potassium chloride 20 MEQ/15ML (10%) oral solution Take 20 mEq by mouth daily      sucralfate (CARAFATE) 1 GM tablet Take 1 g by mouth 4 times daily      sterile water SOLN with amino acids 10 % SOLN 1.3 g/kg, dextrose 70 % SOLN 20 % Infuse intravenously continuous      gabapentin (NEURONTIN) 300 MG capsule Take 300 mg by mouth daily. (Patient not taking: Reported on 8/31/2021)      mirtazapine (REMERON) 30 MG tablet Take 30 mg by mouth nightly (Patient not taking: Reported on 8/31/2021)      CALCIUM-VITAMIN D PO Take by mouth daily       No current facility-administered medications for this visit. Review of Systems  Constitutional: No fever, no chills, no diaphoresis, no generalized weakness. HEENT: No blurred vision, No sore throat, no ear pain, no hair loss  Neck: denied any neck swelling, difficulty swallowing,   Cardio-pulmonary: No CP, SOB or palpitation, No orthopnea or PND. No cough or wheezing. GI: No N/V/D, no constipation, No abdominal pain, no melena or hematochezia   : Denied any dysuria, hematuria, flank pain, discharge, or incontinence. Skin: denied any rash, ulcer, Hirsute, or hyperpigmentation. MSK: denied any joint deformity, joint pain/swelling, muscle pain, or back pain. Neuro: no numbness, no tingling, no weakness, _    OBJECTIVE    BP (!) 91/57   Pulse 65   Ht 5' 3\" (1.6 m)   Wt 84 lb (38.1 kg)   SpO2 100%   BMI 14.88 kg/m²   BP Readings from Last 4 Encounters:   08/31/21 (!) 91/57   04/23/21 (!) 84/38   03/02/21 110/68   03/02/20 (!) 90/58     Wt Readings from Last 6 Encounters:   08/31/21 84 lb (38.1 kg)   04/23/21 89 lb (40.4 kg)   03/02/21 94 lb 12.8 oz (43 kg)   03/02/20 84 lb 9.6 oz (38.4 kg)   08/30/19 93 lb 6.4 oz (42.4 kg)       Physical examination:  General: awake alert, oriented x3, no abnormal position or movements. HEENT: normocephalic non-traumatic, no exophthalmos   Neck: supple, no LN enlargement, no thyromegaly, no thyroid tenderness, no JVD. Pulm: Clear equal air entry no added sounds, no wheezing or rhonchi    CVS: S1 + S2, no murmur, no heave. Dorsalis pedis pulse palpable   Abd: soft lax, no tenderness, no organomegaly, audible bowel sounds. Skin: warm, no lesions, no rash.  No callus, no Ulcers, No acanthosis nigricans  Musculoskeletal: No increase vitamin D     3. Hypothyroidism, unspecified type   Patient currently on levothyroxine total of 225 mcg 1 tablet once daily. This is being managed by PCP. Since patient has significant malabsorption Tirosint may be a better option. I advised pt to discuss with PCP            I personally spent greater than 30 minutes reviewing external notes from PCP and other patient's care team providers, and personally interpreted labs associated with the above diagnosis. I also ordered labs to further assess and manage the above addressed medical conditions. Return in about 6 months (around 2/28/2022). The above issues were reviewed with the patient who understood and agreed with the plan. Thank you for allowing us to participate in the care of this patient. Please do not hesitate to contact us with any additional questions. Lynn CLEMONS    [facility] End[facility]   [address] [place] [id number]   Phone: [phone]  Fax: [phone]  --------------------------------------------  An electronic signature was used to authenticate this note.  Lynn CLEMONS on 8/31/2021 at 10:49 AM

## 2021-10-22 ENCOUNTER — HOSPITAL ENCOUNTER (OUTPATIENT)
Dept: INFUSION THERAPY | Age: 61
Setting detail: INFUSION SERIES
Discharge: HOME | End: 2021-10-22
Payer: MEDICARE

## 2021-10-22 VITALS
HEIGHT: 63 IN | WEIGHT: 85 LBS | TEMPERATURE: 97.5 F | RESPIRATION RATE: 16 BRPM | DIASTOLIC BLOOD PRESSURE: 50 MMHG | HEART RATE: 64 BPM | BODY MASS INDEX: 15.06 KG/M2 | SYSTOLIC BLOOD PRESSURE: 69 MMHG | OXYGEN SATURATION: 100 %

## 2021-10-22 DIAGNOSIS — M81.0 SENILE OSTEOPOROSIS: Primary | ICD-10-CM

## 2021-10-22 PROCEDURE — 6360000002 HC RX W HCPCS: Performed by: INTERNAL MEDICINE

## 2021-10-22 PROCEDURE — 96372 THER/PROPH/DIAG INJ SC/IM: CPT

## 2021-10-22 RX ADMIN — DENOSUMAB 60 MG: 60 INJECTION SUBCUTANEOUS at 09:58

## 2021-10-22 ASSESSMENT — PAIN SCALES - GENERAL: PAINLEVEL_OUTOF10: 2

## 2021-10-22 ASSESSMENT — PAIN DESCRIPTION - LOCATION: LOCATION: ABDOMEN

## 2021-10-22 ASSESSMENT — PAIN DESCRIPTION - PAIN TYPE: TYPE: CHRONIC PAIN

## 2021-10-22 NOTE — PROGRESS NOTES
Patient tolerated injection well. Therapy plan reviewed with patient. Verbalizes understanding. Reviewed AVS with patient, reviewed medication information, verbalizes good knowledge of current plan, and has no signs or symptoms to report at this time. Declines copy of AVS.  Next appointment made and patient instructed on lab draw and procedure for next injection. Pt BP very low but she states she is always low, denies any s/s.

## 2021-11-18 DIAGNOSIS — M81.8 OTHER OSTEOPOROSIS, UNSPECIFIED PATHOLOGICAL FRACTURE PRESENCE: ICD-10-CM

## 2021-11-24 ENCOUNTER — TELEPHONE (OUTPATIENT)
Dept: ENDOCRINOLOGY | Age: 61
End: 2021-11-24

## 2021-11-24 NOTE — TELEPHONE ENCOUNTER
----- Message from SHAVONNE Power sent at 11/22/2021  8:29 AM EST -----  Please call patient and inform her I have reviewed DEXA scan. Still shows osteoporosis.  We will discuss further at next office visit

## 2022-03-08 ENCOUNTER — OFFICE VISIT (OUTPATIENT)
Dept: ENDOCRINOLOGY | Age: 62
End: 2022-03-08
Payer: MEDICARE

## 2022-03-08 VITALS
SYSTOLIC BLOOD PRESSURE: 94 MMHG | BODY MASS INDEX: 17.36 KG/M2 | DIASTOLIC BLOOD PRESSURE: 42 MMHG | HEIGHT: 63 IN | HEART RATE: 98 BPM | WEIGHT: 98 LBS | OXYGEN SATURATION: 99 % | RESPIRATION RATE: 18 BRPM

## 2022-03-08 DIAGNOSIS — E03.9 HYPOTHYROIDISM, UNSPECIFIED TYPE: ICD-10-CM

## 2022-03-08 DIAGNOSIS — M81.8 OTHER OSTEOPOROSIS, UNSPECIFIED PATHOLOGICAL FRACTURE PRESENCE: Primary | ICD-10-CM

## 2022-03-08 DIAGNOSIS — E55.9 VITAMIN D DEFICIENCY: ICD-10-CM

## 2022-03-08 PROCEDURE — G8419 CALC BMI OUT NRM PARAM NOF/U: HCPCS | Performed by: INTERNAL MEDICINE

## 2022-03-08 PROCEDURE — G8484 FLU IMMUNIZE NO ADMIN: HCPCS | Performed by: INTERNAL MEDICINE

## 2022-03-08 PROCEDURE — 3017F COLORECTAL CA SCREEN DOC REV: CPT | Performed by: INTERNAL MEDICINE

## 2022-03-08 PROCEDURE — 1036F TOBACCO NON-USER: CPT | Performed by: INTERNAL MEDICINE

## 2022-03-08 PROCEDURE — G8427 DOCREV CUR MEDS BY ELIG CLIN: HCPCS | Performed by: INTERNAL MEDICINE

## 2022-03-08 PROCEDURE — 99214 OFFICE O/P EST MOD 30 MIN: CPT | Performed by: INTERNAL MEDICINE

## 2022-03-08 NOTE — PROGRESS NOTES
[address] [place] [address] Department of Endocrinology Diabetes and Metabolism   [address] [facility] [address]   Phone: [phone]  Fax: [phone]    Date of Service: 3/8/2022  Primary Care Physician: Krupa Mistry DO  Provider: Noemi Mortimer, MD     Reason for the visit: Osteoporosis, vitamin D deficiency    History of Present Illness: The history is provided by the patient. No  was used. Accuracy of the patient data is excellent. Bee Brannon is a very pleasant 59 y.o. female seen today for follow up     In 2/2016 she fell and broke her Rt forearm. Was diagnosed with osteoporosis then, before that had osteopenia. She reported significant GI issue. She had multiple GI surgeries for perforated large peptic ulcer in 2009 and 1/2022. She also used to be on feeding tube but has not used it recently.       The patient denied personal or family history of kidney stone. With h/o significant PUD, bisphosphonates were avoided. Reported was given Calcitonin initially by PCP and still on it.      Started on Prolia in March/2018 and tolerating it very well     DXA scan 10/11/2017   Total Hip T-score of -4.2 --> 13.4% decrease in BMD from baseline exam of 06/10/2013 . LS T-score of -4.1 --> 10.2% decrease in BMD from baseline.     last DXA 10/2019, Hip T score - 4.0, LS T score -4.0     She is currently on vitD 50,000 daily x 5 days then 2 tabs a day on weekends. Level improved. Last vitamin D was 27 (8/21)    No recent falls or fractures. No pending dental work. Hypothyroidism  Currently on Levothyroxine 175 mcg 1 tab 6 days a week and none on Sunday   Labs 1/2022 TSH 5.3, FT4 1.03      PAST MEDICAL HISTORY   Past Medical History:   Diagnosis Date    Arthritis     OSTEO    Hx of blood clots        PAST SURGICAL HISTORY   No past surgical history on file. SOCIAL HISTORY   Tobacco:   reports that she has never smoked.  She has never used smokeless tobacco.  Alcohol:   has no history on file for alcohol use. Drugs:   has no history on file for drug use. FAMILY HISTORY   No family history on file. ALLERGIES AND DRUG REACTIONS   Allergies   Allergen Reactions    Metronidazole Nausea And Vomiting       CURRENT MEDICATIONS   Current Outpatient Medications   Medication Sig Dispense Refill    levothyroxine (SYNTHROID) 25 MCG tablet Take 25 mcg by mouth daily      levothyroxine (SYNTHROID) 200 MCG tablet Take 200 mcg by mouth daily      escitalopram (LEXAPRO) 10 MG tablet TAKE 1 TABLET DAILY      ergocalciferol (ERGOCALCIFEROL) 1.25 MG (34962 UT) capsule One tab po Monday through thursday, 2 tablets, Friday, Saturday and Sunday. 120 capsule 3    sterile water SOLN with amino acids 10 % SOLN 1.3 g/kg, dextrose 70 % SOLN 20 % Infuse intravenously continuous      ALPRAZolam (XANAX) 0.5 MG tablet Take 0.5 mg by mouth nightly as needed for Sleep.  cyanocobalamin 1000 MCG/ML injection Inject 1,000 mcg into the muscle every 7 days      gabapentin (NEURONTIN) 300 MG capsule Take 300 mg by mouth daily.        hyoscyamine (LEVBID) 375 MCG extended release tablet Take 375 mcg by mouth as needed for Cramping      mirtazapine (REMERON) 30 MG tablet Take 30 mg by mouth nightly       CALCIUM-VITAMIN D PO Take by mouth daily      Nutritional Supplements (VITAL AF 1.2 ELISABETH ADV FORMULA PO) Take by mouth daily      Multiple Vitamins-Minerals (MULTIVITAMIN ADULT PO) Take by mouth daily      ondansetron (ZOFRAN) 8 MG tablet Take 8 mg by mouth every 8 hours as needed for Nausea or Vomiting      Pancrelipase, Lip-Prot-Amyl, (CREON PO) Take by mouth 180,000 units, 02632 units 562796--- takes with meals      Polyethylene Glycol 3350 (MIRALAX PO) Take by mouth daily      potassium chloride 20 MEQ/15ML (10%) oral solution Take 20 mEq by mouth daily      sucralfate (CARAFATE) 1 GM tablet Take 1 g by mouth 4 times daily      denosumab (PROLIA) 60 MG/ML SOSY SC injection Inject 1 mL into the skin once for 1 dose 1 mL 0     No current facility-administered medications for this visit. Review of Systems  Constitutional: No fever, no chills, no diaphoresis, no generalized weakness. HEENT: No blurred vision, No sore throat, no ear pain, no hair loss  Neck: denied any neck swelling, difficulty swallowing,   Cardio-pulmonary: No CP, SOB or palpitation, No orthopnea or PND. No cough or wheezing. GI: No N/V/D, no constipation, No abdominal pain, no melena or hematochezia   : Denied any dysuria, hematuria, flank pain, discharge, or incontinence. Skin: denied any rash, ulcer, Hirsute, or hyperpigmentation. MSK: denied any joint deformity, joint pain/swelling, muscle pain, or back pain. Neuro: no numbness, no tingling, no weakness, _    OBJECTIVE    BP (!) 94/42   Pulse 98   Resp 18   Ht 5' 3\" (1.6 m)   Wt 98 lb (44.5 kg)   SpO2 99%   BMI 17.36 kg/m²   BP Readings from Last 4 Encounters:   03/08/22 (!) 94/42   10/22/21 (!) 69/50   08/31/21 (!) 91/57   04/23/21 (!) 84/38     Wt Readings from Last 6 Encounters:   03/08/22 98 lb (44.5 kg)   10/22/21 85 lb (38.6 kg)   08/31/21 84 lb (38.1 kg)   04/23/21 89 lb (40.4 kg)   03/02/21 94 lb 12.8 oz (43 kg)   03/02/20 84 lb 9.6 oz (38.4 kg)       Physical examination:  General: awake alert, oriented x3, no abnormal position or movements. HEENT: normocephalic non-traumatic, no exophthalmos   Neck: supple, no LN enlargement, no thyromegaly, no thyroid tenderness, no JVD. Pulm: Clear equal air entry no added sounds, no wheezing or rhonchi    CVS: S1 + S2, no murmur, no heave. Dorsalis pedis pulse palpable   Abd: soft lax, no tenderness, no organomegaly, audible bowel sounds. Skin: warm, no lesions, no rash. No callus, no Ulcers, No acanthosis nigricans  Musculoskeletal: No back tenderness, no kyphosis/scoliosis    Neuro: CN intact, , muscle power normal  Psych: normal mood, and affect      Review of Laboratory Data:   I personally reviewed the following lab:  No results found for: WBC, RBC, HGB, HCT, MCV, MCH, MCHC, RDW, PLT, MPV, GRANULOCYTES, MARCUS, BANDS   Lab Results   Component Value Date/Time     09/04/2019 12:00 AM    K 3.1 09/04/2019 12:00 AM    CREATININE 1.07 09/04/2019 12:00 AM    CALCIUM 8.5 09/04/2019 12:00 AM      Lab Results   Component Value Date/Time    TSH 2.3 09/04/2019 12:00 AM    T4FREE 1.13 09/04/2019 12:00 AM     No results found for: LABA1C, GLUCOSE, MALBCR, LABMICR, LABCREA  No results found for: LABA1C  No results found for: TRIG, HDL, LDLCALC, CHOL  Lab Results   Component Value Date    VITD25 37.4 09/04/2019       ASSESSMENT & RECOMMENDATIONS   Mercedes Herrmann, a 59 y.o.-old female seen in for the following issues       Assessment:      Diagnosis Orders   1. Other osteoporosis, unspecified pathological fracture presence  Basic Metabolic Panel    TSH    T4, Free    Vitamin D 25 Hydroxy    Basic Metabolic Panel   2. Hypothyroidism, unspecified type  TSH    T4, Free   3. Vitamin D deficiency  Vitamin D 25 Hydroxy    Basic Metabolic Panel       Plan:     1. Other osteoporosis, unspecified pathological fracture presence   · main risk- malabsorption  · Aavoiding oral Bisphosphonate due to significant GI problems and large gastric ulcer and GI bleeping  · last DXA 10/2019, Hip T score - 4.0, LS T score -4.0  · Continue prolia (has been on it since 3/2018, tolerated well)   · Calcium level normal  · Continue ergocalciferol to 50,000 international units 1 tablet Monday through Thursday, 2 tablets on Friday Saturday and Sunday  · fall precautions discussed  · Discussed the importance of muscle strengthening and weight bearing exercise. 2. Vitamin D deficiency   · Continue itamin D     3.  Hypothyroidism, unspecified type   · Patient currently on levothyroxine total of 175 mcg 1 tab 6 days a wk and none on Sunday      I personally reviewed external notes from PCP and other patient's care team providers, and personally interpreted labs associated with the above diagnosis. I also ordered labs to further assess and manage the above addressed medical conditions    Return in about 1 year (around 3/8/2023) for osteoporosis, VitD deficiency, hypothyroidism. The above issues were reviewed with the patient who understood and agreed with the plan. Thank you for allowing us to participate in the care of this patient. Please do not hesitate to contact us with any additional questions. Vinicius Bird MD    [facility] Diabetes Care and Endocrinology   [address] [place] [address]   Phone: [phone]  Fax: [phone]  --------------------------------------------  An electronic signature was used to authenticate this note.  Vinicius Bird MD on 3/8/2022 at 11:38 AM

## 2022-03-29 DIAGNOSIS — M81.0 AGE RELATED OSTEOPOROSIS, UNSPECIFIED PATHOLOGICAL FRACTURE PRESENCE: ICD-10-CM

## 2022-03-29 DIAGNOSIS — M81.0 OSTEOPOROSIS, UNSPECIFIED OSTEOPOROSIS TYPE, UNSPECIFIED PATHOLOGICAL FRACTURE PRESENCE: ICD-10-CM

## 2022-03-29 DIAGNOSIS — M81.8 OTHER OSTEOPOROSIS, UNSPECIFIED PATHOLOGICAL FRACTURE PRESENCE: Primary | ICD-10-CM

## 2022-03-29 RX ORDER — DENOSUMAB 60 MG/ML
60 INJECTION SUBCUTANEOUS ONCE
Qty: 1 ML | Refills: 1 | Status: SHIPPED | OUTPATIENT
Start: 2022-03-29 | End: 2022-03-29

## 2022-04-22 ENCOUNTER — HOSPITAL ENCOUNTER (OUTPATIENT)
Dept: INFUSION THERAPY | Age: 62
Setting detail: INFUSION SERIES
Discharge: HOME | End: 2022-04-22
Payer: MEDICARE

## 2022-04-22 VITALS
DIASTOLIC BLOOD PRESSURE: 42 MMHG | TEMPERATURE: 96.9 F | RESPIRATION RATE: 16 BRPM | SYSTOLIC BLOOD PRESSURE: 95 MMHG | BODY MASS INDEX: 17.65 KG/M2 | HEART RATE: 60 BPM | HEIGHT: 63 IN | OXYGEN SATURATION: 100 % | WEIGHT: 99.6 LBS

## 2022-04-22 DIAGNOSIS — M81.0 SENILE OSTEOPOROSIS: Primary | ICD-10-CM

## 2022-04-22 PROCEDURE — 96372 THER/PROPH/DIAG INJ SC/IM: CPT

## 2022-04-22 PROCEDURE — 6360000002 HC RX W HCPCS: Performed by: INTERNAL MEDICINE

## 2022-04-22 RX ADMIN — DENOSUMAB 60 MG: 60 INJECTION SUBCUTANEOUS at 10:00

## 2022-10-27 ENCOUNTER — HOSPITAL ENCOUNTER (OUTPATIENT)
Dept: INFUSION THERAPY | Age: 62
Setting detail: INFUSION SERIES
Discharge: HOME | End: 2022-10-27
Payer: MEDICARE

## 2022-10-27 VITALS
TEMPERATURE: 97.5 F | OXYGEN SATURATION: 100 % | RESPIRATION RATE: 16 BRPM | BODY MASS INDEX: 17.72 KG/M2 | HEIGHT: 63 IN | WEIGHT: 100 LBS | SYSTOLIC BLOOD PRESSURE: 93 MMHG | HEART RATE: 53 BPM | DIASTOLIC BLOOD PRESSURE: 51 MMHG

## 2022-10-27 DIAGNOSIS — M81.0 SENILE OSTEOPOROSIS: Primary | ICD-10-CM

## 2022-10-27 PROCEDURE — 6360000002 HC RX W HCPCS: Performed by: INTERNAL MEDICINE

## 2022-10-27 PROCEDURE — 96372 THER/PROPH/DIAG INJ SC/IM: CPT

## 2022-10-27 RX ADMIN — DENOSUMAB 60 MG: 60 INJECTION SUBCUTANEOUS at 13:30

## 2023-03-08 ENCOUNTER — OFFICE VISIT (OUTPATIENT)
Dept: ENDOCRINOLOGY | Age: 63
End: 2023-03-08

## 2023-03-08 VITALS — DIASTOLIC BLOOD PRESSURE: 56 MMHG | OXYGEN SATURATION: 100 % | SYSTOLIC BLOOD PRESSURE: 87 MMHG | HEART RATE: 61 BPM

## 2023-03-08 DIAGNOSIS — M81.8 OTHER OSTEOPOROSIS, UNSPECIFIED PATHOLOGICAL FRACTURE PRESENCE: Primary | ICD-10-CM

## 2023-03-08 DIAGNOSIS — E03.9 HYPOTHYROIDISM, UNSPECIFIED TYPE: ICD-10-CM

## 2023-03-08 DIAGNOSIS — M81.0 AGE RELATED OSTEOPOROSIS, UNSPECIFIED PATHOLOGICAL FRACTURE PRESENCE: ICD-10-CM

## 2023-03-08 DIAGNOSIS — E55.9 VITAMIN D DEFICIENCY: ICD-10-CM

## 2023-03-08 RX ORDER — LEVOTHYROXINE SODIUM 175 UG/1
175 TABLET ORAL DAILY
Qty: 90 TABLET | Refills: 3
Start: 2023-03-08

## 2023-03-08 RX ORDER — LEVOTHYROXINE SODIUM 0.15 MG/1
TABLET ORAL
COMMUNITY
Start: 2023-02-04 | End: 2023-03-08

## 2023-03-08 RX ORDER — METHENAMINE HIPPURATE 1000 MG/1
TABLET ORAL
COMMUNITY
Start: 2023-02-18

## 2023-03-08 RX ORDER — QUETIAPINE FUMARATE 25 MG/1
TABLET, FILM COATED ORAL
COMMUNITY
Start: 2023-02-13

## 2023-03-08 RX ORDER — HYDROCORTISONE 20 MG/1
TABLET ORAL 2 TIMES DAILY
COMMUNITY
Start: 2023-02-12

## 2023-03-08 RX ORDER — ESCITALOPRAM OXALATE 20 MG/1
TABLET ORAL
COMMUNITY
Start: 2023-03-04

## 2023-03-08 RX ORDER — SENNA PLUS 8.6 MG/1
1 TABLET ORAL DAILY
COMMUNITY

## 2023-03-08 NOTE — PROGRESS NOTES
[address] [place] [address] Department of Endocrinology Diabetes and Metabolism   [address] [facility] [address]   Phone: [phone]  Fax: [phone]    Date of Service: 3/8/2023  Primary Care Physician: Roslyn Perez DO  Provider: Gunner Hernandez MD     Reason for the visit: Osteoporosis, vitamin D deficiency    History of Present Illness: The history is provided by the patient. No  was used. Accuracy of the patient data is excellent. Barbara Pacheco is a very pleasant 57 y.o. female seen today for follow up     In 2/2016 she fell and broke her Rt forearm. Was diagnosed with osteoporosis then, before that had osteopenia. She reported significant GI issue. She had multiple GI surgeries for perforated large peptic ulcer in 2009 and 1/2022. She also used to be on feeding tube but has not used it recently. The patient denied personal or family history of kidney stone. With h/o significant PUD, bisphosphonates were avoided. Reported was given Calcitonin initially by PCP and still on it. Started on Prolia in March/2018 and tolerating it very well     DXA scan 10/11/2017   Total Hip T-score of -4.2 --> 13.4% decrease in BMD from baseline exam of 06/10/2013 . LS T-score of -4.1 --> 10.2% decrease in BMD from baseline. last DXA 10/2019, Hip T score - 4.0, LS T score -4.0     She is currently on vitD 50,000 daily x 5 days then 2 tabs a day on weekends. Level improved. Pt recently broke her Rt fibula after twisting her Rt ankle joint       Hypothyroidism  Currently on Levothyroxine 175 mcg daily   Due for labs now     PAST MEDICAL HISTORY   Past Medical History:   Diagnosis Date    Arthritis     OSTEO    Hx of blood clots        PAST SURGICAL HISTORY   No past surgical history on file. SOCIAL HISTORY   Tobacco:   reports that she has never smoked. She has never used smokeless tobacco.  Alcohol:   has no history on file for alcohol use.   Drugs: has no history on file for drug use. FAMILY HISTORY   No family history on file. ALLERGIES AND DRUG REACTIONS   Allergies   Allergen Reactions    Metronidazole Nausea And Vomiting       CURRENT MEDICATIONS   Current Outpatient Medications   Medication Sig Dispense Refill    escitalopram (LEXAPRO) 20 MG tablet       hydrocortisone (CORTEF) 20 MG tablet 2 times daily      QUEtiapine (SEROQUEL) 25 MG tablet TAKE ONE TABLET BY MOUTH AT BEDTIME      senna (SENOKOT) 8.6 MG tablet Take 1 tablet by mouth daily      methenamine (HIPREX) 1 g tablet TAKE ONE TABLET BY MOUTH TWICE A DAY WITH MEALS      levothyroxine (SYNTHROID) 175 MCG tablet Take 1 tablet by mouth daily 90 tablet 3    denosumab (PROLIA) 60 MG/ML SOSY SC injection Inject 1 mL into the skin once for 1 dose 1 mL 1    ergocalciferol (ERGOCALCIFEROL) 1.25 MG (35929 UT) capsule One tab po Monday through thursday, 2 tablets, Friday, Saturday and Sunday. 120 capsule 3    sterile water SOLN with amino acids 10 % SOLN 1.3 g/kg, dextrose 70 % SOLN 20 % Infuse intravenously continuous      ALPRAZolam (XANAX) 0.5 MG tablet Take 0.5 mg by mouth nightly as needed for Sleep. cyanocobalamin 1000 MCG/ML injection Inject 1,000 mcg into the muscle every 7 days      gabapentin (NEURONTIN) 300 MG capsule Take 300 mg by mouth daily.        hyoscyamine (LEVBID) 375 MCG extended release tablet Take 375 mcg by mouth as needed for Cramping      CALCIUM-VITAMIN D PO Take by mouth daily      Nutritional Supplements (VITAL AF 1.2 ELISABETH ADV FORMULA PO) Take by mouth daily      Multiple Vitamins-Minerals (MULTIVITAMIN ADULT PO) Take by mouth daily      ondansetron (ZOFRAN) 8 MG tablet Take 8 mg by mouth every 8 hours as needed for Nausea or Vomiting      Pancrelipase, Lip-Prot-Amyl, (CREON PO) Take by mouth 180,000 units, 74447 units 128231--- takes with meals      Polyethylene Glycol 3350 (MIRALAX PO) Take by mouth daily      potassium chloride 20 MEQ/15ML (10%) oral solution Take 20 mEq by mouth daily      mirtazapine (REMERON) 30 MG tablet Take 30 mg by mouth nightly  (Patient not taking: Reported on 3/8/2023)      sucralfate (CARAFATE) 1 GM tablet Take 1 g by mouth 4 times daily (Patient not taking: Reported on 3/8/2023)       No current facility-administered medications for this visit. Review of Systems  Constitutional: No fever, no chills, no diaphoresis, no generalized weakness. HEENT: No blurred vision, No sore throat, no ear pain, no hair loss  Neck: denied any neck swelling, difficulty swallowing,   Cardio-pulmonary: No CP, SOB or palpitation, No orthopnea or PND. No cough or wheezing. GI: No N/V/D, no constipation, No abdominal pain, no melena or hematochezia   : Denied any dysuria, hematuria, flank pain, discharge, or incontinence. Skin: denied any rash, ulcer, Hirsute, or hyperpigmentation. MSK: denied any joint deformity, joint pain/swelling, muscle pain, or back pain. Neuro: no numbness, no tingling, no weakness, _    OBJECTIVE    BP (!) 87/56   Pulse 61   SpO2 100%   BP Readings from Last 4 Encounters:   03/08/23 (!) 87/56   10/27/22 (!) 93/51   04/22/22 (!) 95/42   03/08/22 (!) 94/42     Wt Readings from Last 6 Encounters:   10/27/22 100 lb (45.4 kg)   04/22/22 99 lb 9.6 oz (45.2 kg)   03/08/22 98 lb (44.5 kg)   10/22/21 85 lb (38.6 kg)   08/31/21 84 lb (38.1 kg)   04/23/21 89 lb (40.4 kg)       Physical examination:  General: awake alert, oriented x3, no abnormal position or movements. HEENT: normocephalic non-traumatic, no exophthalmos   Neck: supple, no LN enlargement, no thyromegaly, no thyroid tenderness, no JVD. Pulm: Clear equal air entry no added sounds, no wheezing or rhonchi    CVS: S1 + S2, no murmur, no heave. Dorsalis pedis pulse palpable   Abd: soft lax, no tenderness, no organomegaly, audible bowel sounds. Skin: warm, no lesions, no rash.  No callus, no Ulcers, No acanthosis nigricans  Musculoskeletal: No back tenderness, no kyphosis/scoliosis    Neuro: CN intact, , muscle power normal  Psych: normal mood, and affect      Review of Laboratory Data:  I personally reviewed the following lab:  No results found for: WBC, RBC, HGB, HCT, MCV, MCH, MCHC, RDW, PLT, MPV, GRANULOCYTES, MARCUS, NEUT, BANDS   Lab Results   Component Value Date/Time     09/04/2019 12:00 AM    K 3.1 09/04/2019 12:00 AM    CREATININE 1.07 09/04/2019 12:00 AM    CALCIUM 8.5 09/04/2019 12:00 AM      Lab Results   Component Value Date/Time    TSH 2.3 09/04/2019 12:00 AM    T4FREE 1.13 09/04/2019 12:00 AM     No results found for: LABA1C, GLUCOSE, MALBCR, LABMICR, LABCREA  No results found for: LABA1C  No results found for: TRIG, HDL, LDLCALC, CHOL  Lab Results   Component Value Date/Time    VITD25 37.4 09/04/2019 12:00 AM       ASSESSMENT & RECOMMENDATIONS   Amy Moy, a 57 y.o.-old female seen in for the following issues       Assessment:      Diagnosis Orders   1. Other osteoporosis, unspecified pathological fracture presence  DEXA BONE DENSITY AXIAL SKELETON    Basic Metabolic Panel    Vitamin D 25 Hydroxy      2. Hypothyroidism, unspecified type  levothyroxine (SYNTHROID) 175 MCG tablet    TSH    T4, Free    TSH    T4, Free      3. Age related osteoporosis, unspecified pathological fracture presence  DEXA BONE DENSITY AXIAL SKELETON    Basic Metabolic Panel    Vitamin D 25 Hydroxy      4. Vitamin D deficiency  Basic Metabolic Panel    Vitamin D 25 Hydroxy          Plan:     1. Other osteoporosis, unspecified pathological fracture presence   main risk- malabsorption  Aavoiding oral Bisphosphonate due to significant GI problems and large gastric ulcer and GI bleeping  last DXA 10/2019, Hip T score - 4.0, LS T score -4.0  Next DXA scan 8/2023   Continue prolia (has been on it since 3/2018, tolerated well).  Next dose 4/2023   Calcium level normal  Continue ergocalciferol to 50,000 international units 1 tablet Monday through Thursday, 2 tablets on Friday Saturday and Sunday  fall precautions discussed  Discussed the importance of muscle strengthening and weight bearing exercise. 2. Vitamin D deficiency   Continue itamin D     3. Hypothyroidism, unspecified type   Patient currently on levothyroxine total of 175 mcg daily   Check level in few weeks      I personally reviewed external notes from PCP and other patient's care team providers, and personally interpreted labs associated with the above diagnosis. I also ordered labs to further assess and manage the above addressed medical conditions    Return in about 1 year (around 3/8/2024) for osteoporosis, hypothyroidism, VitD deficiency. The above issues were reviewed with the patient who understood and agreed with the plan. Thank you for allowing us to participate in the care of this patient. Please do not hesitate to contact us with any additional questions. Facundo Alexander MD    [facility] Diabetes Care and Endocrinology   [address] [place] [address]   Phone: [phone]  Fax: [phone]  --------------------------------------------  An electronic signature was used to authenticate this note.  Facundo Alexander MD on 3/8/2023 at 10:27 AM

## 2023-04-21 DIAGNOSIS — M81.8 OTHER OSTEOPOROSIS, UNSPECIFIED PATHOLOGICAL FRACTURE PRESENCE: ICD-10-CM

## 2023-04-21 DIAGNOSIS — M81.0 AGE RELATED OSTEOPOROSIS, UNSPECIFIED PATHOLOGICAL FRACTURE PRESENCE: ICD-10-CM

## 2023-04-21 DIAGNOSIS — M81.0 OSTEOPOROSIS, UNSPECIFIED OSTEOPOROSIS TYPE, UNSPECIFIED PATHOLOGICAL FRACTURE PRESENCE: ICD-10-CM

## 2023-04-23 RX ORDER — DENOSUMAB 60 MG/ML
60 INJECTION SUBCUTANEOUS ONCE
Qty: 1 ML | Refills: 1 | Status: SHIPPED | OUTPATIENT
Start: 2023-04-23 | End: 2023-04-23

## 2023-04-24 DIAGNOSIS — M81.0 OSTEOPOROSIS, UNSPECIFIED OSTEOPOROSIS TYPE, UNSPECIFIED PATHOLOGICAL FRACTURE PRESENCE: ICD-10-CM

## 2023-04-24 DIAGNOSIS — M81.0 AGE RELATED OSTEOPOROSIS, UNSPECIFIED PATHOLOGICAL FRACTURE PRESENCE: ICD-10-CM

## 2023-04-24 DIAGNOSIS — M81.8 OTHER OSTEOPOROSIS, UNSPECIFIED PATHOLOGICAL FRACTURE PRESENCE: ICD-10-CM

## 2023-04-24 RX ORDER — DENOSUMAB 60 MG/ML
60 INJECTION SUBCUTANEOUS ONCE
Qty: 1 ML | Refills: 1 | Status: SHIPPED | OUTPATIENT
Start: 2023-04-24 | End: 2023-04-24

## 2023-04-27 ENCOUNTER — HOSPITAL ENCOUNTER (OUTPATIENT)
Dept: INFUSION THERAPY | Age: 63
Setting detail: INFUSION SERIES
End: 2023-04-27

## 2023-05-04 ENCOUNTER — HOSPITAL ENCOUNTER (OUTPATIENT)
Dept: INFUSION THERAPY | Age: 63
Setting detail: INFUSION SERIES
Discharge: HOME | End: 2023-05-04
Payer: MEDICARE

## 2023-05-04 VITALS
HEIGHT: 63 IN | RESPIRATION RATE: 16 BRPM | TEMPERATURE: 97.7 F | OXYGEN SATURATION: 100 % | HEART RATE: 70 BPM | BODY MASS INDEX: 17.72 KG/M2 | WEIGHT: 100 LBS | DIASTOLIC BLOOD PRESSURE: 49 MMHG | SYSTOLIC BLOOD PRESSURE: 89 MMHG

## 2023-05-04 DIAGNOSIS — M81.0 SENILE OSTEOPOROSIS: Primary | ICD-10-CM

## 2023-05-04 PROCEDURE — 6360000002 HC RX W HCPCS: Performed by: INTERNAL MEDICINE

## 2023-05-04 PROCEDURE — 96372 THER/PROPH/DIAG INJ SC/IM: CPT

## 2023-05-04 RX ADMIN — DENOSUMAB 60 MG: 60 INJECTION SUBCUTANEOUS at 10:28

## 2023-05-04 ASSESSMENT — PAIN DESCRIPTION - FREQUENCY: FREQUENCY: CONTINUOUS

## 2023-05-04 ASSESSMENT — PAIN DESCRIPTION - LOCATION: LOCATION: ANKLE

## 2023-05-04 ASSESSMENT — PAIN DESCRIPTION - ORIENTATION: ORIENTATION: RIGHT

## 2023-05-04 ASSESSMENT — PAIN SCALES - GENERAL: PAINLEVEL_OUTOF10: 3

## 2023-05-04 ASSESSMENT — PAIN DESCRIPTION - DESCRIPTORS: DESCRIPTORS: ACHING

## 2023-05-15 ENCOUNTER — TELEPHONE (OUTPATIENT)
Dept: ENDOCRINOLOGY | Age: 63
End: 2023-05-15

## 2023-05-15 DIAGNOSIS — E03.9 HYPOTHYROIDISM, UNSPECIFIED TYPE: Primary | ICD-10-CM

## 2023-05-15 NOTE — TELEPHONE ENCOUNTER
Pt states she had her TSH drawn on 4/20 and her level was 10.400.  She is wondering if she needs treatment

## 2023-05-17 NOTE — TELEPHONE ENCOUNTER
Before adjusting her dose please check if she was missing any doses or was taking medication with calcium, iron or MV.  If not, will increase dose to 200 mcg daily and recheck level in 6-8 weeks

## 2023-05-19 DIAGNOSIS — E03.9 HYPOTHYROIDISM, UNSPECIFIED TYPE: Primary | ICD-10-CM

## 2023-05-19 NOTE — TELEPHONE ENCOUNTER
Pt does not miss doses or take her medication with food or vitamins. She would like her dose raised. The lab orders were mailed.

## 2023-05-21 RX ORDER — LEVOTHYROXINE SODIUM 0.2 MG/1
200 TABLET ORAL DAILY
Qty: 90 TABLET | Refills: 1 | Status: SHIPPED | OUTPATIENT
Start: 2023-05-21

## 2023-11-09 ENCOUNTER — HOSPITAL ENCOUNTER (OUTPATIENT)
Dept: INFUSION THERAPY | Age: 63
Setting detail: INFUSION SERIES
Discharge: HOME | End: 2023-11-09
Payer: MEDICARE

## 2023-11-09 VITALS
HEART RATE: 61 BPM | BODY MASS INDEX: 17.72 KG/M2 | TEMPERATURE: 97.2 F | WEIGHT: 100 LBS | HEIGHT: 63 IN | DIASTOLIC BLOOD PRESSURE: 49 MMHG | SYSTOLIC BLOOD PRESSURE: 94 MMHG | RESPIRATION RATE: 16 BRPM | OXYGEN SATURATION: 100 %

## 2023-11-09 DIAGNOSIS — M81.0 SENILE OSTEOPOROSIS: Primary | ICD-10-CM

## 2023-11-09 PROCEDURE — 96372 THER/PROPH/DIAG INJ SC/IM: CPT

## 2023-11-09 PROCEDURE — 6360000002 HC RX W HCPCS: Performed by: INTERNAL MEDICINE

## 2023-11-09 RX ADMIN — DENOSUMAB 60 MG: 60 INJECTION SUBCUTANEOUS at 10:31

## 2024-01-11 DIAGNOSIS — E03.9 HYPOTHYROIDISM, UNSPECIFIED TYPE: ICD-10-CM

## 2024-01-15 RX ORDER — LEVOTHYROXINE SODIUM 0.2 MG/1
200 TABLET ORAL DAILY
Qty: 90 TABLET | Refills: 0 | Status: SHIPPED | OUTPATIENT
Start: 2024-01-15

## 2024-05-02 DIAGNOSIS — M81.0 OSTEOPOROSIS, UNSPECIFIED OSTEOPOROSIS TYPE, UNSPECIFIED PATHOLOGICAL FRACTURE PRESENCE: ICD-10-CM

## 2024-05-02 DIAGNOSIS — M81.8 OTHER OSTEOPOROSIS, UNSPECIFIED PATHOLOGICAL FRACTURE PRESENCE: ICD-10-CM

## 2024-05-02 DIAGNOSIS — M81.0 AGE RELATED OSTEOPOROSIS, UNSPECIFIED PATHOLOGICAL FRACTURE PRESENCE: ICD-10-CM

## 2024-05-02 RX ORDER — DENOSUMAB 60 MG/ML
60 INJECTION SUBCUTANEOUS ONCE
Qty: 1 ML | Refills: 0 | Status: SHIPPED | OUTPATIENT
Start: 2024-05-02 | End: 2024-05-02

## 2024-05-02 NOTE — TELEPHONE ENCOUNTER
Needs script and lab orders for Prolia on the 9th, does she need to be seen? LOV 03.28.23, supposed to be seen in a year

## 2024-05-06 DIAGNOSIS — M81.8 OTHER OSTEOPOROSIS, UNSPECIFIED PATHOLOGICAL FRACTURE PRESENCE: ICD-10-CM

## 2024-05-06 DIAGNOSIS — M81.0 OSTEOPOROSIS, UNSPECIFIED OSTEOPOROSIS TYPE, UNSPECIFIED PATHOLOGICAL FRACTURE PRESENCE: ICD-10-CM

## 2024-05-06 DIAGNOSIS — M81.0 AGE RELATED OSTEOPOROSIS, UNSPECIFIED PATHOLOGICAL FRACTURE PRESENCE: ICD-10-CM

## 2024-05-06 DIAGNOSIS — M81.0 OSTEOPOROSIS, UNSPECIFIED OSTEOPOROSIS TYPE, UNSPECIFIED PATHOLOGICAL FRACTURE PRESENCE: Primary | ICD-10-CM

## 2024-05-06 DIAGNOSIS — E55.9 VITAMIN D DEFICIENCY: ICD-10-CM

## 2024-05-06 RX ORDER — DENOSUMAB 60 MG/ML
60 INJECTION SUBCUTANEOUS ONCE
Qty: 1 EACH | Refills: 2 | Status: SHIPPED | OUTPATIENT
Start: 2024-05-06 | End: 2024-05-06

## 2024-05-07 LAB
ALBUMIN SERPL-MCNC: 3 G/DL
ALP BLD-CCNC: 53 U/L
ALT SERPL-CCNC: 25 U/L
ANION GAP SERPL CALCULATED.3IONS-SCNC: NORMAL MMOL/L
AST SERPL-CCNC: 36 U/L
BILIRUB SERPL-MCNC: 0.3 MG/DL (ref 0.1–1.4)
BUN BLDV-MCNC: 10 MG/DL
CALCIUM SERPL-MCNC: 9.1 MG/DL
CHLORIDE BLD-SCNC: 115 MMOL/L
CO2: 27 MMOL/L
CREAT SERPL-MCNC: 1.47 MG/DL
EGFR: NORMAL
GLUCOSE BLD-MCNC: 92 MG/DL
POTASSIUM SERPL-SCNC: 2.9 MMOL/L
SODIUM BLD-SCNC: 147 MMOL/L
TOTAL PROTEIN: 5.8
VITAMIN D 25-HYDROXY: 35
VITAMIN D2, 25 HYDROXY: NORMAL
VITAMIN D3,25 HYDROXY: NORMAL

## 2024-05-09 ENCOUNTER — HOSPITAL ENCOUNTER (OUTPATIENT)
Dept: INFUSION THERAPY | Age: 64
Setting detail: INFUSION SERIES
Discharge: HOME | End: 2024-05-09
Payer: MEDICARE

## 2024-05-09 VITALS
RESPIRATION RATE: 16 BRPM | TEMPERATURE: 97.5 F | SYSTOLIC BLOOD PRESSURE: 95 MMHG | HEART RATE: 60 BPM | OXYGEN SATURATION: 98 % | DIASTOLIC BLOOD PRESSURE: 45 MMHG

## 2024-05-09 DIAGNOSIS — M81.0 SENILE OSTEOPOROSIS: Primary | ICD-10-CM

## 2024-05-09 PROCEDURE — 96372 THER/PROPH/DIAG INJ SC/IM: CPT

## 2024-05-09 PROCEDURE — 6360000002 HC RX W HCPCS: Performed by: INTERNAL MEDICINE

## 2024-05-09 RX ADMIN — DENOSUMAB 60 MG: 60 INJECTION SUBCUTANEOUS at 10:42

## 2024-05-09 ASSESSMENT — PAIN DESCRIPTION - LOCATION: LOCATION: ABDOMEN;ANKLE

## 2024-05-09 ASSESSMENT — PAIN DESCRIPTION - ORIENTATION: ORIENTATION: RIGHT;LEFT

## 2024-05-09 ASSESSMENT — PAIN SCALES - GENERAL: PAINLEVEL_OUTOF10: 2

## 2024-05-09 ASSESSMENT — PAIN DESCRIPTION - DESCRIPTORS: DESCRIPTORS: ACHING;DULL

## 2024-05-09 NOTE — PROGRESS NOTES
Before  prolia  injection  patient declined any infections, open wounds, or change in medical condition. Tolerated infusion well.  Reviewed therapy plan, offered education material and/or discharge material, reviewed medication information and signs and symptoms  and educated on possible side effects, verbalizes good knowledge of current plan patient verbalizes understanding, and has no signs or symptoms to report at this time. Patient discharged. Patient alert and oriented x3.   No distress noted.   Vital signs stable.   Patient denies any new or worsening pain.  Patient denies any needs.  All questions answered.  Next appointment scheduled.

## 2024-05-10 DIAGNOSIS — M81.0 OSTEOPOROSIS, UNSPECIFIED OSTEOPOROSIS TYPE, UNSPECIFIED PATHOLOGICAL FRACTURE PRESENCE: ICD-10-CM

## 2024-05-10 DIAGNOSIS — E55.9 VITAMIN D DEFICIENCY: ICD-10-CM

## 2024-08-02 ENCOUNTER — TELEPHONE (OUTPATIENT)
Dept: ENDOCRINOLOGY | Age: 64
End: 2024-08-02

## 2024-08-02 DIAGNOSIS — E03.9 HYPOTHYROIDISM, UNSPECIFIED TYPE: ICD-10-CM

## 2024-08-02 RX ORDER — LEVOTHYROXINE SODIUM 0.2 MG/1
200 TABLET ORAL DAILY
Qty: 30 TABLET | Refills: 0 | Status: SHIPPED | OUTPATIENT
Start: 2024-08-02

## 2024-09-11 ENCOUNTER — OFFICE VISIT (OUTPATIENT)
Dept: ENDOCRINOLOGY | Age: 64
End: 2024-09-11
Payer: MEDICARE

## 2024-09-11 VITALS
SYSTOLIC BLOOD PRESSURE: 92 MMHG | DIASTOLIC BLOOD PRESSURE: 57 MMHG | BODY MASS INDEX: 21.44 KG/M2 | HEIGHT: 63 IN | OXYGEN SATURATION: 100 % | HEART RATE: 65 BPM | WEIGHT: 121 LBS

## 2024-09-11 DIAGNOSIS — E03.9 HYPOTHYROIDISM, UNSPECIFIED TYPE: Primary | ICD-10-CM

## 2024-09-11 DIAGNOSIS — E55.9 VITAMIN D DEFICIENCY: ICD-10-CM

## 2024-09-11 DIAGNOSIS — M81.8 OTHER OSTEOPOROSIS, UNSPECIFIED PATHOLOGICAL FRACTURE PRESENCE: ICD-10-CM

## 2024-09-11 PROCEDURE — G8420 CALC BMI NORM PARAMETERS: HCPCS | Performed by: INTERNAL MEDICINE

## 2024-09-11 PROCEDURE — G8427 DOCREV CUR MEDS BY ELIG CLIN: HCPCS | Performed by: INTERNAL MEDICINE

## 2024-09-11 PROCEDURE — 1090F PRES/ABSN URINE INCON ASSESS: CPT | Performed by: INTERNAL MEDICINE

## 2024-09-11 PROCEDURE — 3017F COLORECTAL CA SCREEN DOC REV: CPT | Performed by: INTERNAL MEDICINE

## 2024-09-11 PROCEDURE — 1123F ACP DISCUSS/DSCN MKR DOCD: CPT | Performed by: INTERNAL MEDICINE

## 2024-09-11 PROCEDURE — G8399 PT W/DXA RESULTS DOCUMENT: HCPCS | Performed by: INTERNAL MEDICINE

## 2024-09-11 PROCEDURE — 99214 OFFICE O/P EST MOD 30 MIN: CPT | Performed by: INTERNAL MEDICINE

## 2024-09-11 PROCEDURE — 1036F TOBACCO NON-USER: CPT | Performed by: INTERNAL MEDICINE

## 2024-09-11 RX ORDER — ERGOCALCIFEROL 1.25 MG/1
CAPSULE ORAL
Qty: 360 CAPSULE | Refills: 3 | Status: SHIPPED | OUTPATIENT
Start: 2024-09-11

## 2024-09-11 RX ORDER — LEVOTHYROXINE SODIUM 200 UG/1
200 TABLET ORAL DAILY
Qty: 90 TABLET | Refills: 3 | Status: SHIPPED | OUTPATIENT
Start: 2024-09-11

## 2024-11-04 NOTE — PROGRESS NOTES
Called spoke with patient to remind her to have labs done before appointment next week she said having today at [facility] [place] and will make sure they get results to Dr. Hayden

## 2024-11-08 NOTE — PROGRESS NOTES
Called left message for patient wanted to make sure she had her labs done we still have no lab results and left infusion number for return call. I called to Dr. Hayden's office and left message on Clinical staff line requesting them to fax recent calcium if they had one and left infusion phone number and fax

## 2024-11-11 ENCOUNTER — HOSPITAL ENCOUNTER (OUTPATIENT)
Dept: INFUSION THERAPY | Age: 64
Setting detail: INFUSION SERIES
Discharge: HOME | End: 2024-11-11
Payer: MEDICARE

## 2024-11-11 VITALS
SYSTOLIC BLOOD PRESSURE: 97 MMHG | BODY MASS INDEX: 21.26 KG/M2 | HEIGHT: 63 IN | WEIGHT: 120 LBS | DIASTOLIC BLOOD PRESSURE: 52 MMHG | HEART RATE: 73 BPM | TEMPERATURE: 97.3 F | RESPIRATION RATE: 18 BRPM | OXYGEN SATURATION: 99 %

## 2024-11-11 DIAGNOSIS — M81.0 SENILE OSTEOPOROSIS: Primary | ICD-10-CM

## 2024-11-11 PROCEDURE — 96372 THER/PROPH/DIAG INJ SC/IM: CPT

## 2024-11-11 PROCEDURE — 6360000002 HC RX W HCPCS: Performed by: INTERNAL MEDICINE

## 2024-11-11 RX ADMIN — DENOSUMAB 60 MG: 60 INJECTION SUBCUTANEOUS at 10:23

## 2024-11-11 ASSESSMENT — PAIN DESCRIPTION - LOCATION: LOCATION: FOOT

## 2024-11-11 ASSESSMENT — PAIN DESCRIPTION - PAIN TYPE: TYPE: ACUTE PAIN

## 2024-11-11 ASSESSMENT — PAIN DESCRIPTION - FREQUENCY: FREQUENCY: CONTINUOUS

## 2024-11-11 ASSESSMENT — PAIN DESCRIPTION - DESCRIPTORS: DESCRIPTORS: ACHING

## 2024-11-11 ASSESSMENT — PAIN SCALES - GENERAL: PAINLEVEL_OUTOF10: 2

## 2024-11-11 ASSESSMENT — PAIN DESCRIPTION - ORIENTATION: ORIENTATION: RIGHT

## 2025-01-27 LAB
ALBUMIN SERPL-MCNC: 3.3 G/DL (ref 3.5–5.2)
ALP SERPL-CCNC: 54 U/L (ref 35–104)
ALT SERPL-CCNC: 33 U/L (ref 0–32)
ANION GAP SERPL CALCULATED.3IONS-SCNC: 12 MMOL/L (ref 7–16)
AST SERPL-CCNC: 44 U/L (ref 0–31)
BASOPHILS # BLD: 0.06 K/UL (ref 0–0.2)
BASOPHILS NFR BLD: 1 % (ref 0–2)
BILIRUB SERPL-MCNC: <0.2 MG/DL (ref 0–1.2)
BUN SERPL-MCNC: 12 MG/DL (ref 6–23)
CALCIUM SERPL-MCNC: 8.1 MG/DL (ref 8.6–10.2)
CHLORIDE SERPL-SCNC: 106 MMOL/L (ref 98–107)
CO2 SERPL-SCNC: 17 MMOL/L (ref 22–29)
CREAT SERPL-MCNC: 1.2 MG/DL (ref 0.5–1)
EOSINOPHIL # BLD: 0.14 K/UL (ref 0.05–0.5)
EOSINOPHILS RELATIVE PERCENT: 3 % (ref 0–6)
ERYTHROCYTE [DISTWIDTH] IN BLOOD BY AUTOMATED COUNT: 14 % (ref 11.5–15)
GFR, ESTIMATED: 48 ML/MIN/1.73M2
GGT SERPL-CCNC: 15 U/L (ref 6–42)
GLUCOSE SERPL-MCNC: 148 MG/DL (ref 74–99)
HCT VFR BLD AUTO: 35.7 % (ref 34–48)
HGB BLD-MCNC: 11 G/DL (ref 11.5–15.5)
IMM GRANULOCYTES # BLD AUTO: <0.03 K/UL (ref 0–0.58)
IMM GRANULOCYTES NFR BLD: 0 % (ref 0–5)
LYMPHOCYTES NFR BLD: 1.73 K/UL (ref 1.5–4)
LYMPHOCYTES RELATIVE PERCENT: 33 % (ref 20–42)
MAGNESIUM SERPL-MCNC: 2.2 MG/DL (ref 1.6–2.6)
MCH RBC QN AUTO: 28.9 PG (ref 26–35)
MCHC RBC AUTO-ENTMCNC: 30.8 G/DL (ref 32–34.5)
MCV RBC AUTO: 93.9 FL (ref 80–99.9)
MONOCYTES NFR BLD: 0.52 K/UL (ref 0.1–0.95)
MONOCYTES NFR BLD: 10 % (ref 2–12)
NEUTROPHILS NFR BLD: 54 % (ref 43–80)
NEUTS SEG NFR BLD: 2.87 K/UL (ref 1.8–7.3)
PHOSPHATE SERPL-MCNC: 2.4 MG/DL (ref 2.5–4.5)
PLATELET # BLD AUTO: 242 K/UL (ref 130–450)
PMV BLD AUTO: 10 FL (ref 7–12)
POTASSIUM SERPL-SCNC: 3.3 MMOL/L (ref 3.5–5)
PROT SERPL-MCNC: 5.7 G/DL (ref 6.4–8.3)
RBC # BLD AUTO: 3.8 M/UL (ref 3.5–5.5)
SODIUM SERPL-SCNC: 135 MMOL/L (ref 132–146)
TRIGL SERPL-MCNC: 104 MG/DL
WBC OTHER # BLD: 5.3 K/UL (ref 4.5–11.5)

## 2025-01-29 LAB
INR PPP: 1
PROTHROMBIN TIME: 10.3 SEC (ref 9.3–12.4)

## 2025-04-09 DIAGNOSIS — M81.0 OSTEOPOROSIS, UNSPECIFIED OSTEOPOROSIS TYPE, UNSPECIFIED PATHOLOGICAL FRACTURE PRESENCE: ICD-10-CM

## 2025-04-09 DIAGNOSIS — M81.8 OTHER OSTEOPOROSIS, UNSPECIFIED PATHOLOGICAL FRACTURE PRESENCE: ICD-10-CM

## 2025-04-09 DIAGNOSIS — M81.0 AGE RELATED OSTEOPOROSIS, UNSPECIFIED PATHOLOGICAL FRACTURE PRESENCE: ICD-10-CM

## 2025-04-09 RX ORDER — DENOSUMAB 60 MG/ML
60 INJECTION SUBCUTANEOUS ONCE
Qty: 1 EACH | Refills: 2 | Status: SHIPPED | OUTPATIENT
Start: 2025-04-09 | End: 2025-04-09

## 2025-04-29 NOTE — TELEPHONE ENCOUNTER
Left Voicemail (1st Attempt) and Sent Mychart (1st Attempt) for the patient to call back and schedule the following:    Appointment type: Autonomic Testing  Provider: Dr. Simmons  Return date: next available  Specialty phone number: [phone]  Additional appointment(s) needed: NA  Additonal Notes:     Refill levothyroxine

## 2025-05-05 NOTE — PROGRESS NOTES
Called patient to see if she had labs done yet she said no she was waiting for the doctors office to call her that they faxed the script to her labs I called to Dr. Hayden's office left a message on the clinical line requesting they fax a script to the labs for the patient she wants to have at hospital closer to her house the fax is [phone] and asked if they would please call the patient after they fax the script so the patient can go get the labs.

## 2025-05-09 NOTE — PROGRESS NOTES
Called spoke with patient she verbalizes had labs at [facility] called to Dr. Hayden's office to obtain results office will get and scan in EPIC

## 2025-05-12 ENCOUNTER — OFFICE VISIT (OUTPATIENT)
Dept: ENDOCRINOLOGY | Age: 65
End: 2025-05-12
Payer: MEDICARE

## 2025-05-12 ENCOUNTER — HOSPITAL ENCOUNTER (OUTPATIENT)
Dept: INFUSION THERAPY | Age: 65
Setting detail: INFUSION SERIES
Discharge: HOME | End: 2025-05-12
Payer: MEDICARE

## 2025-05-12 VITALS
OXYGEN SATURATION: 95 % | DIASTOLIC BLOOD PRESSURE: 58 MMHG | RESPIRATION RATE: 18 BRPM | HEIGHT: 63 IN | SYSTOLIC BLOOD PRESSURE: 92 MMHG | WEIGHT: 138 LBS | TEMPERATURE: 98.4 F | BODY MASS INDEX: 24.45 KG/M2 | HEART RATE: 63 BPM

## 2025-05-12 VITALS
HEIGHT: 63 IN | HEART RATE: 54 BPM | BODY MASS INDEX: 24.1 KG/M2 | WEIGHT: 136 LBS | SYSTOLIC BLOOD PRESSURE: 109 MMHG | DIASTOLIC BLOOD PRESSURE: 56 MMHG | TEMPERATURE: 98.2 F | OXYGEN SATURATION: 98 % | RESPIRATION RATE: 16 BRPM

## 2025-05-12 DIAGNOSIS — M81.0 OSTEOPOROSIS, UNSPECIFIED OSTEOPOROSIS TYPE, UNSPECIFIED PATHOLOGICAL FRACTURE PRESENCE: Primary | ICD-10-CM

## 2025-05-12 DIAGNOSIS — M81.0 AGE RELATED OSTEOPOROSIS, UNSPECIFIED PATHOLOGICAL FRACTURE PRESENCE: ICD-10-CM

## 2025-05-12 DIAGNOSIS — E03.9 HYPOTHYROIDISM, UNSPECIFIED TYPE: ICD-10-CM

## 2025-05-12 DIAGNOSIS — M81.0 SENILE OSTEOPOROSIS: Primary | ICD-10-CM

## 2025-05-12 DIAGNOSIS — E55.9 VITAMIN D DEFICIENCY: ICD-10-CM

## 2025-05-12 DIAGNOSIS — M81.8 OTHER OSTEOPOROSIS, UNSPECIFIED PATHOLOGICAL FRACTURE PRESENCE: ICD-10-CM

## 2025-05-12 PROCEDURE — 6360000002 HC RX W HCPCS: Performed by: INTERNAL MEDICINE

## 2025-05-12 PROCEDURE — 96372 THER/PROPH/DIAG INJ SC/IM: CPT

## 2025-05-12 PROCEDURE — 99214 OFFICE O/P EST MOD 30 MIN: CPT | Performed by: INTERNAL MEDICINE

## 2025-05-12 PROCEDURE — 1159F MED LIST DOCD IN RCRD: CPT | Performed by: INTERNAL MEDICINE

## 2025-05-12 PROCEDURE — 1123F ACP DISCUSS/DSCN MKR DOCD: CPT | Performed by: INTERNAL MEDICINE

## 2025-05-12 PROCEDURE — G2211 COMPLEX E/M VISIT ADD ON: HCPCS | Performed by: INTERNAL MEDICINE

## 2025-05-12 RX ORDER — LEVOTHYROXINE SODIUM 200 UG/1
TABLET ORAL
Qty: 90 TABLET | Refills: 3 | Status: SHIPPED | OUTPATIENT
Start: 2025-05-12

## 2025-05-12 RX ORDER — LEVOTHYROXINE SODIUM 25 UG/1
TABLET ORAL
Qty: 90 TABLET | Refills: 3 | Status: SHIPPED | OUTPATIENT
Start: 2025-05-12

## 2025-05-12 RX ORDER — NITROFURANTOIN 25; 75 MG/1; MG/1
100 CAPSULE ORAL 2 TIMES DAILY
COMMUNITY

## 2025-05-12 RX ADMIN — DENOSUMAB 60 MG: 60 INJECTION SUBCUTANEOUS at 10:41

## 2025-05-12 ASSESSMENT — PAIN SCALES - GENERAL: PAINLEVEL_OUTOF10: 3

## 2025-05-12 ASSESSMENT — PAIN DESCRIPTION - LOCATION: LOCATION: ABDOMEN

## 2025-05-12 ASSESSMENT — PAIN DESCRIPTION - FREQUENCY: FREQUENCY: CONTINUOUS

## 2025-05-12 ASSESSMENT — PAIN DESCRIPTION - ONSET: ONSET: ON-GOING

## 2025-05-12 ASSESSMENT — PAIN DESCRIPTION - ORIENTATION: ORIENTATION: MID;LEFT

## 2025-05-12 ASSESSMENT — PAIN DESCRIPTION - PAIN TYPE: TYPE: CHRONIC PAIN

## 2025-05-12 ASSESSMENT — PAIN DESCRIPTION - DESCRIPTORS: DESCRIPTORS: ACHING

## 2025-05-12 NOTE — PROGRESS NOTES
[facility] Department of Endocrinology Diabetes and Metabolism   [address] [place] [address]   Phone: [phone]  Fax: [phone]    Date of Service: 5/12/2025  Primary Care Physician: Edinson Cerna DO  Provider: Paco Hayden MD     Reason for the visit: Osteoporosis, vitamin D deficiency    History of Present Illness:  The history is provided by the patient. No  was used. Accuracy of the patient data is excellent.  Alysha Soares is a very pleasant 64 y.o. female seen today for follow up     In 2/2016 she fell and broke her Rt forearm. Was diagnosed with osteoporosis then, before that had osteopenia.  She reported significant GI issue. She had multiple GI surgeries for perforated large peptic ulcer in 2009 and 1/2022. She also used to be on feeding tube but has not used it recently.       The patient denied personal or family history of kidney stone.     With h/o significant PUD, bisphosphonates were avoided.  Reported was given Calcitonin initially by PCP and still on it.      Patient currently on the Prolia injection and tolerating it very well.  She has been on Prolia since 2018    DXA scan 10/11/2017   Total Hip T-score of -4.2 --> 13.4% decrease in BMD from baseline exam of 06/10/2013 .  LS T-score of -4.1 --> 10.2% decrease in BMD from baseline.     last DXA 10/2019, Hip T score - 4.0, LS T score -4.0     DEXA scan at [facility] in November 2023 showed the femoral neck T-score of -2.8, total hip T-score of -3.6, radius T-score of -5.0.    She is currently on vitD 50,000 daily x 5 days then 2 tabs a day on weekends. Level improved.       Hypothyroidism  The patient currently levothyroxine 200 mcg daily    Most recent outside labs at [facility] on May 6, 2025 showed a TSH of 8.4 with a free T4 of 1.14    PAST MEDICAL HISTORY   Past Medical History:   Diagnosis Date    Arthritis     OSTEO    Hx of blood clots        PAST SURGICAL HISTORY   No past